# Patient Record
Sex: MALE | Race: WHITE | NOT HISPANIC OR LATINO | Employment: FULL TIME | URBAN - METROPOLITAN AREA
[De-identification: names, ages, dates, MRNs, and addresses within clinical notes are randomized per-mention and may not be internally consistent; named-entity substitution may affect disease eponyms.]

---

## 2023-06-26 LAB
ANION GAP IN SER/PLAS: 10 MMOL/L (ref 10–20)
CALCIUM (MG/DL) IN SER/PLAS: 9.5 MG/DL (ref 8.6–10.6)
CARBON DIOXIDE, TOTAL (MMOL/L) IN SER/PLAS: 30 MMOL/L (ref 21–32)
CHLORIDE (MMOL/L) IN SER/PLAS: 104 MMOL/L (ref 98–107)
CREATININE (MG/DL) IN SER/PLAS: 0.91 MG/DL (ref 0.5–1.3)
GFR MALE: >90 ML/MIN/1.73M2
GLUCOSE (MG/DL) IN SER/PLAS: 90 MG/DL (ref 74–99)
POTASSIUM (MMOL/L) IN SER/PLAS: 3.9 MMOL/L (ref 3.5–5.3)
SODIUM (MMOL/L) IN SER/PLAS: 140 MMOL/L (ref 136–145)
UREA NITROGEN (MG/DL) IN SER/PLAS: 9 MG/DL (ref 6–23)

## 2023-10-06 PROBLEM — N48.1 BALANITIS: Status: ACTIVE | Noted: 2023-10-06

## 2023-10-06 PROBLEM — L82.1 OTHER SEBORRHEIC KERATOSIS: Status: ACTIVE | Noted: 2021-06-14

## 2023-10-06 PROBLEM — I10 HTN (HYPERTENSION): Status: ACTIVE | Noted: 2023-10-06

## 2023-10-06 PROBLEM — D48.5 NEOPLASM OF UNCERTAIN BEHAVIOR OF SKIN: Status: ACTIVE | Noted: 2021-06-14

## 2023-10-06 PROBLEM — M54.6 PAIN IN THORACIC SPINE: Status: ACTIVE | Noted: 2023-10-06

## 2023-10-06 PROBLEM — N50.819 PERSISTENT TESTICULAR PAIN: Status: ACTIVE | Noted: 2023-10-06

## 2023-10-06 PROBLEM — R41.3 MEMORY CHANGES: Status: ACTIVE | Noted: 2023-10-06

## 2023-10-06 PROBLEM — K40.90 INGUINAL HERNIA: Status: ACTIVE | Noted: 2023-10-06

## 2023-10-06 PROBLEM — L57.9 SKIN CHANGES DUE TO CHRONIC EXPOSURE TO NONIONIZING RADIATION, UNSPECIFIED: Status: ACTIVE | Noted: 2021-06-14

## 2023-10-06 PROBLEM — R22.9 LUMP OF SKIN: Status: ACTIVE | Noted: 2023-10-06
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
## 2023-10-06 PROBLEM — F32.A DEPRESSION: Status: ACTIVE | Noted: 2023-10-06

## 2023-10-06 PROBLEM — K44.9 HIATAL HERNIA: Status: ACTIVE | Noted: 2023-10-06

## 2023-10-06 PROBLEM — N28.1 COMPLEX RENAL CYST: Status: ACTIVE | Noted: 2023-10-06

## 2023-10-06 PROBLEM — R94.31 LONG QT INTERVAL: Status: ACTIVE | Noted: 2023-10-06

## 2023-10-06 PROBLEM — M19.011 ARTHRITIS OF RIGHT ACROMIOCLAVICULAR JOINT: Status: ACTIVE | Noted: 2023-10-06

## 2023-10-06 PROBLEM — L82.0 INFLAMED SEBORRHEIC KERATOSIS: Status: ACTIVE | Noted: 2021-06-14

## 2023-10-06 PROBLEM — S22.000A: Status: ACTIVE | Noted: 2023-10-06

## 2023-10-06 PROBLEM — N20.0 CALCULUS OF KIDNEY: Status: ACTIVE | Noted: 2023-10-06

## 2023-10-06 PROBLEM — N20.0 NEPHROLITHIASIS, URIC ACID: Status: ACTIVE | Noted: 2023-10-06

## 2023-10-06 PROBLEM — D22.70 MELANOCYTIC NEVI OF UNSPECIFIED LOWER LIMB, INCLUDING HIP: Status: ACTIVE | Noted: 2021-06-14

## 2023-10-06 PROBLEM — R91.8 PULMONARY NODULES: Status: ACTIVE | Noted: 2023-10-06

## 2023-10-06 PROBLEM — T14.8XXA SKIN ABRASION: Status: ACTIVE | Noted: 2023-10-06

## 2023-10-06 PROBLEM — R10.13 DYSPEPSIA: Status: ACTIVE | Noted: 2023-10-06

## 2023-10-06 PROBLEM — D18.01 HEMANGIOMA OF SKIN AND SUBCUTANEOUS TISSUE: Status: ACTIVE | Noted: 2021-06-14

## 2023-10-06 PROBLEM — C44.41 BASAL CELL CARCINOMA OF SKIN OF SCALP AND NECK: Status: ACTIVE | Noted: 2021-06-14

## 2023-10-06 PROBLEM — K22.9 ESOPHAGEAL ABNORMALITY: Status: ACTIVE | Noted: 2023-10-06

## 2023-10-06 PROBLEM — M85.88 OSTEOPENIA OF LUMBAR SPINE: Status: ACTIVE | Noted: 2023-10-06

## 2023-10-06 PROBLEM — R01.1 HEART MURMUR: Status: ACTIVE | Noted: 2023-10-06

## 2023-10-06 PROBLEM — D22.39 MELANOCYTIC NEVI OF OTHER PARTS OF FACE: Status: ACTIVE | Noted: 2021-06-14

## 2023-10-06 PROBLEM — I51.7 LEFT VENTRICULAR HYPERTROPHY: Status: ACTIVE | Noted: 2023-10-06

## 2023-10-06 PROBLEM — N52.9 ERECTILE DYSFUNCTION: Status: ACTIVE | Noted: 2023-10-06

## 2023-10-06 PROBLEM — L21.9 SEBORRHEIC DERMATITIS, UNSPECIFIED: Status: ACTIVE | Noted: 2021-06-14

## 2023-10-06 PROBLEM — E55.9 VITAMIN D DEFICIENCY: Status: ACTIVE | Noted: 2023-10-06

## 2023-10-06 PROBLEM — Z85.828 PERSONAL HISTORY OF OTHER MALIGNANT NEOPLASM OF SKIN: Status: ACTIVE | Noted: 2021-06-14

## 2023-10-06 PROBLEM — D22.5 MELANOCYTIC NEVI OF TRUNK: Status: ACTIVE | Noted: 2021-06-14

## 2023-10-06 PROBLEM — M54.14 THORACIC RADICULITIS: Status: ACTIVE | Noted: 2023-10-06

## 2023-10-06 PROBLEM — D22.60 MELANOCYTIC NEVI OF UNSPECIFIED UPPER LIMB, INCLUDING SHOULDER: Status: ACTIVE | Noted: 2021-06-14

## 2023-10-06 PROBLEM — G47.00 INSOMNIA: Status: ACTIVE | Noted: 2023-10-06

## 2023-10-06 PROBLEM — M25.511 RIGHT SHOULDER PAIN: Status: ACTIVE | Noted: 2023-10-06

## 2023-10-06 PROBLEM — N28.89 KIDNEY MASS: Status: ACTIVE | Noted: 2023-10-06

## 2023-10-06 RX ORDER — UBIDECARENONE 75 MG
CAPSULE ORAL
COMMUNITY
Start: 2020-08-31

## 2023-10-06 RX ORDER — KETOCONAZOLE 20 MG/G
1 CREAM TOPICAL
COMMUNITY
Start: 2021-06-14 | End: 2023-10-17

## 2023-10-06 RX ORDER — TRAZODONE HYDROCHLORIDE 50 MG/1
0.5 TABLET ORAL NIGHTLY
COMMUNITY
Start: 2020-09-30 | End: 2023-12-15

## 2023-10-06 RX ORDER — LOSARTAN POTASSIUM AND HYDROCHLOROTHIAZIDE 12.5; 5 MG/1; MG/1
1 TABLET ORAL DAILY
COMMUNITY
Start: 2021-08-19 | End: 2023-11-20 | Stop reason: ALTCHOICE

## 2023-10-06 RX ORDER — TADALAFIL 10 MG/1
10 TABLET ORAL DAILY PRN
COMMUNITY
Start: 2022-04-04

## 2023-10-06 RX ORDER — NYSTATIN AND TRIAMCINOLONE ACETONIDE 100000; 1 [USP'U]/G; MG/G
CREAM TOPICAL 2 TIMES DAILY
COMMUNITY
Start: 2021-03-02 | End: 2023-10-17

## 2023-10-06 RX ORDER — ACETAMINOPHEN 500 MG
TABLET ORAL
COMMUNITY
Start: 2020-08-31

## 2023-10-11 ENCOUNTER — OFFICE VISIT (OUTPATIENT)
Dept: DERMATOLOGY | Facility: CLINIC | Age: 63
End: 2023-10-11
Payer: COMMERCIAL

## 2023-10-11 DIAGNOSIS — Z85.828 HISTORY OF NONMELANOMA SKIN CANCER: ICD-10-CM

## 2023-10-11 DIAGNOSIS — D22.5 MELANOCYTIC NEVUS OF TRUNK: ICD-10-CM

## 2023-10-11 DIAGNOSIS — L57.0 ACTINIC KERATOSIS: ICD-10-CM

## 2023-10-11 DIAGNOSIS — L82.1 SEBORRHEIC KERATOSIS: ICD-10-CM

## 2023-10-11 DIAGNOSIS — L21.9 SEBORRHEIC DERMATITIS: ICD-10-CM

## 2023-10-11 DIAGNOSIS — L57.8 DIFFUSE PHOTODAMAGE OF SKIN: ICD-10-CM

## 2023-10-11 DIAGNOSIS — L91.8 SKIN TAG: ICD-10-CM

## 2023-10-11 DIAGNOSIS — D48.5 NEOPLASM OF UNCERTAIN BEHAVIOR OF SKIN: Primary | ICD-10-CM

## 2023-10-11 PROCEDURE — 88305 TISSUE EXAM BY PATHOLOGIST: CPT | Performed by: DERMATOLOGY

## 2023-10-11 PROCEDURE — 99214 OFFICE O/P EST MOD 30 MIN: CPT | Performed by: DERMATOLOGY

## 2023-10-11 PROCEDURE — 17003 DESTRUCT PREMALG LES 2-14: CPT | Performed by: DERMATOLOGY

## 2023-10-11 PROCEDURE — 17110 DESTRUCTION B9 LES UP TO 14: CPT | Performed by: DERMATOLOGY

## 2023-10-11 PROCEDURE — 17000 DESTRUCT PREMALG LESION: CPT | Performed by: DERMATOLOGY

## 2023-10-11 PROCEDURE — 1036F TOBACCO NON-USER: CPT | Performed by: DERMATOLOGY

## 2023-10-11 PROCEDURE — 88305 TISSUE EXAM BY PATHOLOGIST: CPT

## 2023-10-11 PROCEDURE — 11301 SHAVE SKIN LESION 0.6-1.0 CM: CPT | Performed by: DERMATOLOGY

## 2023-10-11 RX ORDER — KETOCONAZOLE 20 MG/G
CREAM TOPICAL 2 TIMES DAILY
Qty: 60 G | Refills: 11 | Status: SHIPPED | OUTPATIENT
Start: 2023-10-11 | End: 2023-11-08

## 2023-10-11 NOTE — PROGRESS NOTES
Subjective     Kenn Oneal is a 63 y.o. male who presents for the following: Skin Exam.  He notes a brown bump on his right forearm, which appeared 6 months ago and has increased in size and gotten darker in color.  He also notes a bump underneath his left arm, which catches on his clothing and sometimes hurts.    Review of Systems:  No other skin or systemic complaints other than what is documented elsewhere in the note.    The following portions of the chart were reviewed this encounter and updated as appropriate:       Skin Cancer History  - history of BCC on right post auricle scalp diagnosed at initial visit on 11/25/20 s/p Mohs surgery by Dr. Curry on 2/1/21  - no h/o melanoma    Specialty Problems          Dermatology Problems    Basal cell carcinoma of skin of scalp and neck    Hemangioma of skin and subcutaneous tissue    Inflamed seborrheic keratosis    Melanocytic nevi of other parts of face    Melanocytic nevi of trunk    Melanocytic nevi of unspecified lower limb, including hip    Melanocytic nevi of unspecified upper limb, including shoulder    Neoplasm of uncertain behavior of skin    Other seborrheic keratosis    Personal history of other malignant neoplasm of skin    Seborrheic dermatitis, unspecified    Skin changes due to chronic exposure to nonionizing radiation, unspecified     Allergies:  Neosporin (neomycin-polymyx)    Current Medications / CAM's:    Current Outpatient Medications:     apixaban (Eliquis) 5 mg tablet, Take 1 tablet (5 mg) by mouth once daily., Disp: , Rfl:     cholecalciferol (Vitamin D-3) 5,000 Units tablet, Vitamin D3 125 MCG (5000 UT) Oral Tablet  Refills: 0     Tl CANDELARIO, Michael FUNK   Start : 31-Aug-2020  Active, Disp: , Rfl:     cyanocobalamin (Vitamin B-12) 500 mcg tablet, Take by mouth., Disp: , Rfl:     ketoconazole (NIZOral) 2 % cream, 1 Application., Disp: , Rfl:     ketoconazole (NIZOral) 2 % cream, Apply topically 2 times a day for 28 days. To affected areas of  face, Disp: 60 g, Rfl: 11    losartan-hydrochlorothiazide (Hyzaar) 50-12.5 mg tablet, Take 1 tablet by mouth once daily., Disp: , Rfl:     nystatin-triamcinolone (Mycolog II) cream, Apply topically 2 times a day., Disp: , Rfl:     tadalafil (Cialis) 10 mg tablet, Take 1 tablet (10 mg) by mouth once daily as needed., Disp: , Rfl:     traZODone (Desyrel) 50 mg tablet, Take 0.5 tablets (25 mg) by mouth once daily at bedtime., Disp: , Rfl:      Objective   Well appearing patient in no apparent distress; mood and affect are within normal limits.    A full examination was performed including scalp, face, eyes, ears, nose, lips, neck, chest, axillae, abdomen, back, bilateral upper extremities, and bilateral lower extremities. All findings within normal limits unless otherwise noted below.    Assessment/Plan   1. Neoplasm of uncertain behavior of skin  Right Distal Dorsal Forearm  7 mm dark brown pigmented, asymmetric macule with an asymmetric pigment network and irregular borders           Shave removal    Lesion length (cm):  0.7  Margin per side (cm):  0  Lesion diameter (cm):  0.7  Informed consent: discussed and consent obtained    Timeout: patient name, date of birth, surgical site, and procedure verified    Procedure prep:  Patient was prepped and draped  Anesthesia: the lesion was anesthetized in a standard fashion    Anesthetic:  1% lidocaine w/ epinephrine 1-100,000 local infiltration  Instrument used: flexible razor blade    Hemostasis achieved with: aluminum chloride    Outcome: patient tolerated procedure well    Post-procedure details: sterile dressing applied and wound care instructions given    Dressing type: bandage and petrolatum      Staff Communication: Dermatology Local Anesthesia: 1 % Lidocaine / Epinephrine - Amount:0.5ml    Specimen 1 - Dermatopathology- DERM LAB  Differential Diagnosis: SK v DN  Check Margins Yes/No?:    Comments:    Dermpath Lab: Routine Histopathology (formalin-fixed tissue)    2.  Skin tag  Left Axilla  On the patient's left inferior axilla, there is a 6 mm erythematous and flesh-colored, soft, fleshy, pedunculated papule with a surrounding rim of erythema    Inflamed Acrochordon - left inferior axilla.  The benign nature of this skin tag was discussed with the patient today and reassurance provided.  Given the history the patient provides of frequent irritation and associated symptoms as well as its inflamed appearance on exam today, I offered to treat this lesion with liquid nitrogen cryotherapy.  The patient expressed understanding, is in agreement with this plan, and wishes to proceed with cryotherapy today.    Destr of lesion - Left Axilla  Complexity: simple    Destruction method: cryotherapy    Informed consent: discussed and consent obtained    Lesion destroyed using liquid nitrogen: Yes    Cryotherapy cycles:  2  Outcome: patient tolerated procedure well with no complications    Post-procedure details: wound care instructions given      3. Actinic keratosis (3)  Nose (3)  Scattered on the patient's nose, there are 3 erythematous, gritty, scaly macules     Actinic Keratoses - scattered on nose.  The pre-cancerous nature of these lesions and treatment options were discussed with the patient today.  At this time, I recommend treatment with liquid nitrogen cryotherapy.  The patient expressed understanding, is in agreement with this plan, and wishes to proceed with cryotherapy today.    Destr of lesion - Nose  Complexity: simple    Destruction method: cryotherapy    Informed consent: discussed and consent obtained    Lesion destroyed using liquid nitrogen: Yes    Cryotherapy cycles:  1  Outcome: patient tolerated procedure well with no complications    Post-procedure details: wound care instructions given      4. Melanocytic nevus of trunk  Scattered on the patient's face, neck, trunk, and extremities, there are multiple small, round- to oval-shaped, brown-pigmented and pink-colored,  symmetric, uniform-appearing macules and dome-shaped papules    Clinically benign- to slightly atypical-appearing nevi - scattered on face, neck, trunk, and extremities.  The clinically benign- to slightly atypical-appearing nature of the patient's nevi was discussed with the patient today.  None of the patient's nevi meet threshold for biopsy today.  I emphasized the importance of performing monthly self-skin exams using the ABCDs of monitoring moles, which were reviewed with the patient today and an informational hand-out provided.  I also emphasized the importance of sun avoidance and sun protection with daily sunscreen use.  The patient expressed understanding and is in agreement with this plan.    5. Seborrheic keratosis  Scattered on the patient's face, neck, trunk, and extremities, there are multiple tan- to light brown-colored, hyperkeratotic, stuck-on appearing papules of varying size and shape    Seborrheic Keratoses - scattered on face, neck, trunk, and extremities.  The benign nature of these lesions was discussed with the patient today and reassurance provided.  No treatment is medically indicated for these lesions at this time.    6. Seborrheic dermatitis  Head - Anterior (Face)  On the patient's face, mainly the glabella and bilateral eyebrows and perinasal creases, there are pink, scaly patches with whitish-yellowish, greasy scale    Seborrheic Dermatitis - face.  The potentially chronic and intermittently flaring nature of this condition and treatment options were discussed extensively with the patient today.  At this time, I recommend topical anti-fungal therapy with Ketoconazole 2% cream, which the patient was instructed to apply twice daily to the affected areas of the face.  The risks, benefits, and side effects of this medication were discussed.  The patient expressed understanding and is in agreement with this plan.    ketoconazole (NIZOral) 2 % cream - Head - Anterior (Face)  Apply topically  2 times a day for 28 days. To affected areas of face    7. History of nonmelanoma skin cancer  Neck - Posterior  On the patient's right postauricular scalp, there is a well-healed scar with no evidence of recurrent growth on exam today.    History of basal cell carcinoma and actinic keratoses and photodamage.  There is no evidence of recurrence at the site of the patient's previously treated BCC on his right postauricular scalp on exam today.  The signs and symptoms of skin cancer were reviewed and the patient was advised to practice sun protection and sun avoidance, use daily sunscreen, and perform regular self skin exams.  I will have the patient return to our office in 1 year, pending the above biopsy result, for routine follow-up and skin exam, and the patient was instructed to call our office should the patient notice any new, changing, symptomatic, or otherwise concerning skin lesions before then.  The patient expressed understanding and is in agreement with this plan.    8. Diffuse photodamage of skin  Photodistributed  Diffuse photodamage with actinic changes with telangiectasia and mottled pigmentation in sun-exposed areas.    Photodamage.  The signs and symptoms of skin cancer were reviewed and the patient was advised to practice sun protection and sun avoidance, use daily sunscreen, and perform regular self skin exams.  Sun protection was discussed, including avoiding the mid-day sun, wearing a sunscreen with SPF at least 50, and stressing the need for reapplication of sunscreen and applying more than they think they need.

## 2023-10-13 LAB
LABORATORY COMMENT REPORT: NORMAL
PATH REPORT.FINAL DX SPEC: NORMAL
PATH REPORT.GROSS SPEC: NORMAL
PATH REPORT.MICROSCOPIC SPEC OTHER STN: NORMAL
PATH REPORT.RELEVANT HX SPEC: NORMAL
PATH REPORT.TOTAL CANCER: NORMAL

## 2023-10-17 ENCOUNTER — OFFICE VISIT (OUTPATIENT)
Dept: NEUROLOGY | Facility: CLINIC | Age: 63
End: 2023-10-17
Payer: COMMERCIAL

## 2023-10-17 VITALS
HEART RATE: 67 BPM | WEIGHT: 162 LBS | BODY MASS INDEX: 23.24 KG/M2 | SYSTOLIC BLOOD PRESSURE: 118 MMHG | RESPIRATION RATE: 16 BRPM | DIASTOLIC BLOOD PRESSURE: 75 MMHG

## 2023-10-17 DIAGNOSIS — R41.3 MEMORY DISORDER: Primary | ICD-10-CM

## 2023-10-17 PROCEDURE — 3074F SYST BP LT 130 MM HG: CPT | Performed by: PSYCHIATRY & NEUROLOGY

## 2023-10-17 PROCEDURE — 3078F DIAST BP <80 MM HG: CPT | Performed by: PSYCHIATRY & NEUROLOGY

## 2023-10-17 PROCEDURE — 99203 OFFICE O/P NEW LOW 30 MIN: CPT | Performed by: PSYCHIATRY & NEUROLOGY

## 2023-10-17 PROCEDURE — 1036F TOBACCO NON-USER: CPT | Performed by: PSYCHIATRY & NEUROLOGY

## 2023-10-17 RX ORDER — SERTRALINE HYDROCHLORIDE 50 MG/1
TABLET, FILM COATED ORAL
Qty: 30 TABLET | Refills: 3 | Status: CANCELLED | OUTPATIENT
Start: 2023-10-17

## 2023-10-17 RX ORDER — SERTRALINE HYDROCHLORIDE 50 MG/1
TABLET, FILM COATED ORAL
Qty: 90 TABLET | Refills: 2 | Status: SHIPPED | OUTPATIENT
Start: 2023-10-17 | End: 2023-11-30 | Stop reason: ALTCHOICE

## 2023-10-17 RX ORDER — AMLODIPINE BESYLATE 2.5 MG/1
2.5 TABLET ORAL DAILY
COMMUNITY
Start: 2023-09-21 | End: 2024-04-15 | Stop reason: SDUPTHER

## 2023-10-17 ASSESSMENT — ENCOUNTER SYMPTOMS
DEPRESSION: 0
OCCASIONAL FEELINGS OF UNSTEADINESS: 0
LOSS OF SENSATION IN FEET: 0

## 2023-10-17 ASSESSMENT — PATIENT HEALTH QUESTIONNAIRE - PHQ9
SUM OF ALL RESPONSES TO PHQ9 QUESTIONS 1 AND 2: 0
2. FEELING DOWN, DEPRESSED OR HOPELESS: NOT AT ALL
1. LITTLE INTEREST OR PLEASURE IN DOING THINGS: NOT AT ALL

## 2023-10-17 ASSESSMENT — PAIN SCALES - GENERAL: PAINLEVEL: 0-NO PAIN

## 2023-10-17 NOTE — PROGRESS NOTES
Subjective     Prasad Oneal is a right handed  63 y.o. year old male who presents with memory loss.   Visit type: Referral by PCP    HPI  He developed slowly progressive short term memory loss 1.5 year ago.He states that English is not his first language and has difficulty in finding English words but not [place] words which is his native language.He also forgets the names of people and forgets the places of the folder in computer.He is in slower in his job and memory loss impaired his function at work.  He also states that he is clumsy and bumps into objects and drop things but no myoclonic jerk.  Mood:feels depression  after his mother passed away 2 years ago and  his wife last year  Difficulty in falling sleep and staying sleep.He  goes  to bed at 10 pm and he wakes up at 4 and 5 AM and can`t go back to sleep.He snores and is schedules for sleep study.He has fatigue during day.   PMH:HTN,Arrythmia  Medication:Losartan eliquis  FH:Father had PD   Social:Soft ware developper   Occasionally drinks alcohol  He is Vegetarian but eats egg,he is not vegan          Review of Systems  All other system have been reviewed and are negative for complaint.  Objective   Neurological Exam  Mental Status  Awake, alert and oriented to person, place and time.    Cranial Nerves  CN III, IV, VI: Extraocular movements intact bilaterally.  CN V: Facial sensation is normal.  CN VII: Full and symmetric facial movement.    Motor  Normal muscle bulk throughout. Normal muscle tone. No abnormal involuntary movements. Strength is 5/5 throughout all four extremities.    Sensory  Sensation is intact to light touch, pinprick, vibration and proprioception in all four extremities.    Reflexes                                            Right                      Left  Biceps                                 1+                         1+  Patellar                                1+                         1+  Right Plantar: downgoing  Left  Plantar: downgoing    Coordination  Right: Finger-to-nose normal.    Gait  Normal casual, toe, heel and tandem gait.   mild bradykinesia in lt side    Physical Exam  Eyes:      Extraocular Movements: Extraocular movements intact.   Neurological:      Motor: Motor strength is normal.     Deep Tendon Reflexes:      Reflex Scores:       Bicep reflexes are 1+ on the right side and 1+ on the left side.       Patellar reflexes are 1+ on the right side and 1+ on the left side.     Office Visit from 10/17/2023 in [facility] with Jose Mirza MD     10/18/2023     1937 Last Filed Value   MoCA      Visuospatial/Executive 5 5   Naming 3 3   Memory (Score '0' as this is an Unscored Section) -- --   Attention: Read List of Digits 2 2   Attention: Read List of Letters 1 1   Attention: Serial Sevens 3 3   Language: Repeat 2 2   Language: Fluency 0 0   Abstraction 2 2   Delayed Recall 1 1   Orientation 6 6   Add 1 Point if </=12 yr Education 0 0   MOCA Total Score -- --   MoCA Certification                                     Assessment/Plan Prasad Oneal is a right handed  63 y.o. year old male who presents with memory loss.MOCA today was scored 25/30.neurology exam is reassuring.   Plan:  TSH,B12,Neuropsych test,brain MRI without GD

## 2023-10-17 NOTE — LETTER
October 18, 2023     Michael Boothe MD  [address] [facility], [address]  [facility] [address]    Patient: Kenn Oneal   YOB: 1960   Date of Visit: 10/17/2023       Dear Dr. Michael Boothe MD:    Thank you for referring Kenn Oneal to me for evaluation. Below are my notes for this consultation.  If you have questions, please do not hesitate to call me. I look forward to following your patient along with you.       Sincerely,     Jose Mirza MD      CC: No Recipients  ______________________________________________________________________________________    Subjective     Prasad Oneal is a right handed  63 y.o. year old male who presents with memory loss.   Visit type: Referral by PCP    HPI  He developed slowly progressive short term memory loss 1.5 year ago.He states that English is not his first language and has difficulty in finding English words but not [place] words which is his native language.He also forgets the names of people and forgets the places of the folder in computer.He is in slower in his job and memory loss impaired his function at work.  He also states that he is clumsy and bumps into objects and drop things but no myoclonic jerk.  Mood:feels depression  after his mother passed away 2 years ago and  his wife last year  Difficulty in falling sleep and staying sleep.He  goes  to bed at 10 pm and he wakes up at 4 and 5 AM and can`t go back to sleep.He snores and is schedules for sleep study.He has fatigue during day.   PMH:HTN,Arrythmia  Medication:Losartan eliquis  FH:Father had PD   Social:Soft ware developper   Occasionally drinks alcohol  He is Vegetarian but eats egg,he is not vegan          Review of Systems  All other system have been reviewed and are negative for complaint.  Objective   Neurological Exam  Mental Status  Awake, alert and oriented to person, place and time.    Cranial Nerves  CN III, IV, VI:  Extraocular movements intact bilaterally.  CN V: Facial sensation is normal.  CN VII: Full and symmetric facial movement.    Motor  Normal muscle bulk throughout. Normal muscle tone. No abnormal involuntary movements. Strength is 5/5 throughout all four extremities.    Sensory  Sensation is intact to light touch, pinprick, vibration and proprioception in all four extremities.    Reflexes                                            Right                      Left  Biceps                                 1+                         1+  Patellar                                1+                         1+  Right Plantar: downgoing  Left Plantar: downgoing    Coordination  Right: Finger-to-nose normal.    Gait  Normal casual, toe, heel and tandem gait.   mild bradykinesia in lt side    Physical Exam  Eyes:      Extraocular Movements: Extraocular movements intact.   Neurological:      Motor: Motor strength is normal.     Deep Tendon Reflexes:      Reflex Scores:       Bicep reflexes are 1+ on the right side and 1+ on the left side.       Patellar reflexes are 1+ on the right side and 1+ on the left side.     Office Visit from 10/17/2023 in [facility] with Jose Mirza MD     10/18/2023     1937 Last Filed Value   MoCA      Visuospatial/Executive 5 5   Naming 3 3   Memory (Score '0' as this is an Unscored Section) -- --   Attention: Read List of Digits 2 2   Attention: Read List of Letters 1 1   Attention: Serial Sevens 3 3   Language: Repeat 2 2   Language: Fluency 0 0   Abstraction 2 2   Delayed Recall 1 1   Orientation 6 6   Add 1 Point if </=12 yr Education 0 0   MOCA Total Score -- --   MoCA Certification                                     Assessment/Plan Prasad Oneal is a right handed  63 y.o. year old male who presents with memory loss.MOCA today was scored 25/30.neurology exam is reassuring.   Plan:  TSH,B12,Neuropsych test,brain MRI without GD                  Attestation signed by Jose MICHAUD  MD Hermes at 10/18/2023 10:34 PM:  I saw and evaluated the patient. I personally obtained the key and critical portions of the history and physical exam or was physically present for key and critical portions performed by the resident/fellow. I reviewed the resident/fellow's documentation and discussed the patient with the resident/fellow. I agree with the resident/fellow's medical decision making as documented in the note.  Memory loss.  Possible MCI (versus mild dementia).  Agree with cognitive workup.  To then have summary conference to review results with the patient and his family, and determine need for treatment.  Included neuropsych testing since he has depression as a confounder, to differentiate dementia from pseudodementia.   To then have summary conference to review results with the patient and his family, and determine need for treatment.

## 2023-10-17 NOTE — PATIENT INSTRUCTIONS
You were visited by Dr Mirza and Dr Carlisle today,  Your neurologic exam is pretty normal and reassuring.On Memory test you were scored 25/30 and is borderline.Normal score is 26.Your poor sleep and your depression and anxiety could cause short term memory loss as well.Therefore we plan put you on antidepressant called Sertraline which helps with mood and its effect starts after 3 weeks.    Plan:  -Start taking Sertraline half tablet in the morning for one week then after one week take one tablet per day in the morning  -Schedule for brain MRI  -Do blood test you do not need fasting for the test.  -Schedule for neuropsychology test

## 2023-10-18 ASSESSMENT — MONTREAL COGNITIVE ASSESSMENT (MOCA)
9. REPEAT EACH SENTENCE: 2
10. [FLUENCY] NAME WORDS STARTING WITH DESIGNATED LETTER: 0
8. SERIAL SUBTRACTION OF 7S: 3
4. NAME EACH OF THE THREE ANIMALS SHOWN: 3
12. MEMORY INDEX SCORE: 1
11. FOR EACH PAIR OF WORDS, WHAT CATEGORY DO THEY BELONG TO (OUT OF 2): 2
7. [VIGILENCE] TAP WHEN HEARING DESIGNATED LETTER: 1
VISUOSPATIAL/EXECUTIVE SUBSCORE: 5
WHAT LEVEL OF EDUCATION WAS ATTAINED: 0
13. ORIENTATION SUBSCORE: 6
6. READ LIST OF DIGITS [FORWARD/BACKWARD]: 2

## 2023-10-19 ENCOUNTER — TELEPHONE (OUTPATIENT)
Dept: PRIMARY CARE | Facility: CLINIC | Age: 63
End: 2023-10-19
Payer: COMMERCIAL

## 2023-10-19 NOTE — TELEPHONE ENCOUNTER
Result Communication    No results found from the In Basket message.    7:13 AM      Results  successfully communicated with the  and they  their understanding.  This note was entered in error

## 2023-10-24 ENCOUNTER — LAB (OUTPATIENT)
Dept: LAB | Facility: LAB | Age: 63
End: 2023-10-24
Payer: COMMERCIAL

## 2023-10-24 DIAGNOSIS — R41.3 MEMORY DISORDER: ICD-10-CM

## 2023-10-24 LAB
TSH SERPL-ACNC: 1 MIU/L (ref 0.44–3.98)
VIT B12 SERPL-MCNC: 618 PG/ML (ref 211–911)

## 2023-10-24 PROCEDURE — 84443 ASSAY THYROID STIM HORMONE: CPT

## 2023-10-24 PROCEDURE — 82607 VITAMIN B-12: CPT

## 2023-10-24 PROCEDURE — 36415 COLL VENOUS BLD VENIPUNCTURE: CPT
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
## 2023-11-02 ENCOUNTER — OFFICE VISIT (OUTPATIENT)
Dept: PSYCHOLOGY | Facility: CLINIC | Age: 63
End: 2023-11-02
Payer: COMMERCIAL

## 2023-11-02 DIAGNOSIS — F33.0 MAJOR DEPRESSIVE DISORDER, RECURRENT, MILD (CMS-HCC): ICD-10-CM

## 2023-11-02 DIAGNOSIS — R41.3 COMPLAINTS OF MEMORY DISTURBANCE: ICD-10-CM

## 2023-11-02 DIAGNOSIS — G31.84 MILD NEUROCOGNITIVE DISORDER: Primary | ICD-10-CM

## 2023-11-02 PROCEDURE — 3074F SYST BP LT 130 MM HG: CPT

## 2023-11-02 PROCEDURE — 1036F TOBACCO NON-USER: CPT

## 2023-11-02 PROCEDURE — 99211NT NEUROPYSCH TESTING PENDING FINAL BILLING

## 2023-11-02 PROCEDURE — 3078F DIAST BP <80 MM HG: CPT

## 2023-11-09 ENCOUNTER — TELEMEDICINE CLINICAL SUPPORT (OUTPATIENT)
Dept: PSYCHOLOGY | Facility: CLINIC | Age: 63
End: 2023-11-09
Payer: COMMERCIAL

## 2023-11-09 DIAGNOSIS — R41.3 COMPLAINTS OF MEMORY DISTURBANCE: ICD-10-CM

## 2023-11-09 DIAGNOSIS — F33.0 MAJOR DEPRESSIVE DISORDER, RECURRENT, MILD (CMS-HCC): ICD-10-CM

## 2023-11-09 DIAGNOSIS — R41.3 MEMORY LOSS: Primary | ICD-10-CM

## 2023-11-09 DIAGNOSIS — G31.84 MILD NEUROCOGNITIVE DISORDER: ICD-10-CM

## 2023-11-09 PROCEDURE — 96138 PSYCL/NRPSYC TECH 1ST: CPT | Mod: 95

## 2023-11-09 PROCEDURE — 96138 PSYCL/NRPSYC TECH 1ST: CPT

## 2023-11-09 PROCEDURE — 96139 PSYCL/NRPSYC TST TECH EA: CPT | Mod: 95

## 2023-11-09 PROCEDURE — 96116 NUBHVL XM PHYS/QHP 1ST HR: CPT | Mod: AH,95

## 2023-11-09 PROCEDURE — 96132 NRPSYC TST EVAL PHYS/QHP 1ST: CPT | Mod: AH,95

## 2023-11-09 PROCEDURE — 96132 NRPSYC TST EVAL PHYS/QHP 1ST: CPT

## 2023-11-09 PROCEDURE — 96139 PSYCL/NRPSYC TST TECH EA: CPT

## 2023-11-09 PROCEDURE — 96116 NUBHVL XM PHYS/QHP 1ST HR: CPT

## 2023-11-09 PROCEDURE — 96133 NRPSYC TST EVAL PHYS/QHP EA: CPT

## 2023-11-09 PROCEDURE — 96133 NRPSYC TST EVAL PHYS/QHP EA: CPT | Mod: AH,95

## 2023-11-09 NOTE — PROGRESS NOTES
NEUROPSYCHOLOGICAL FEEDBACK NOTE  Name: Prasad Oneal  : 1960  MRN: [id number]  Referring Provider: Jose Mirza MD    Date of Service: 23    Reason for Referral: Prasad Oneal was referred for neuropsychological evaluation given a history of reported memory loss. The patient returned today via virtual appointment for feedback regarding cognitive testing that took place on 2023.    This appointment was completed virtually, and the risks and benefits of receiving telehealth services were reviewed. A safety plan was created confirming the location of the patient and alternative contact number for the patient if the telehealth encounter is interrupted. In-office care may be recommended if needed. Telehealth sessions are not being recorded and personal health information is protected. All questions were answered, and the patient provided verbal consent to proceed with telehealth services.     Diagnosis:   Memory loss   Mild Neurocognitive Disorder   Major Depressive Disorder    Session Detail:   Current findings were discussed in detail including the diagnosis of Mild Neurocognitive Disorder due to deficits with initial learning of visual information, as well as variable performance on speed of processing and executive functioning tasks. He was informed the exact etiology is unknown, but further diagnostic testing and follow-up was recommended. The patient showed adequate understanding of all findings and recommendations. All questions were answered and the patient reported having access to the full report.     15807 = 0    2023: 45322 = 1; 60373 = 1; 29686 = 2; 98480 = 1; 53931 = 7

## 2023-11-13 NOTE — PROGRESS NOTES
NEUROPSYCHOLOGICAL DATA SUMMARY    Name: Prasad Oneal   : 1960   MRN: [id number]     Date of Service: 2023     Age: 63 y.o.   Race: White  Education: 18  Preferred Hand: RH  Examiner: Araceli Rodriguez PsyD  Technician: Kimberley Dow MA    Descriptors:     T-Score Standard Score Z-Score Scaled Score %ile Rank   Exceptionally High > 70 > 130 > 2.0 > 16 > 98   Above Average 64-69 120-129 1.4-1.9 15 91-97   High Average 57-63 110-119 0.7-1.3 12-14 75-90   Average 43-56  0.6 to -0.6 8-11 25-74   Low Average 37-42 80-89 -1.3 to -0.7 6-7 9-24   Below Average 30-36 70-79 -2.0 to -1.4 4-5 2-8   Exceptionally Low < 30 < 70 < -2.0 < 4 < 2                Premorbid  Raw Std Sc %ile    WTAR Reading  50 126 96    WTAR EST FSIQ 50 125 95            WAIS IV  Raw Std Sc %ile    Similarities  27 105 63    DS Total   30 110 75    DS Forward  12 110 75    DS Backward 9 105 63    DS Sequencing  9 105 63    Matrix  21 120 91    Coding  55 95 37            RBANS: Form = 1 Raw Std Sc %ile    Line Orientation  15 91.75 29            Stroop  Raw Std Sc %ile    Word   78 67 1    Color  61 77.5 7    Color-Word  40 95.5 38    Interference  7 110.5 76            TMT: Reg  Raw Std Sc %ile    Part A: Errors = 0 71 55 0    Part B: Errors= 0 138 70 2            Grooved Pegs Raw Std Sc %ile    Dom: Drops= 0 78 89.5 24    Non Dom: Drops= 0 81 95.5 38            Naming   Raw Std Sc %ile    NAB Naming  30 106 66            COWAT  Raw Std Sc %ile    Phonemic  34 88 21    Semantic   20 94 34            WMS-IV         LM I   16 80 9    LM II   14 85 16    LM Rec  A= 10 B= 10 3-9    LM Contrast - 105 63            ECFT  Raw Std Sc %ile    Copy   33 100 50    Immediate Recall  6 80 9    Delayed Recall  5 75 5    Delayed Recognition  10 90 25    Matching   10 - >15th            HVLT-R: Form= 1 Raw Std Sc %ile    Trial 1   7 95.5 38    Trial 2   11 110.5 76    Trial 3   12 115 84    Total Recall (sum T1-T3)  30 107.5 69    Trial 4 (Delayed  Recall)  12 116.5 86    % Retained   100 107.5 69    Total Hits   12 - -    Total False Positives  1 - -    Discrimination Index  11 103 58            BVMT-R: Form= 1  Raw Std Sc %ile    Trial 1   3 82 12    Trial 2   4 71.5 3    Trial 3   3 55 0    Total Recall  10 65.5 1    Learning   1 77.5 7    Delayed Recall  4 70 2    % Retained   100 - >16    Total Hits   5 - 11-16    Total False Positives  0 - >16    Discrimination Index  5 - 11-16    Copy  12 -             Clock   WNL              M-WCST  Raw Std Sc %ile    # Cat  6 107.5 69    # Persev Errors 1 101.5 54    # Total Errors 3 112 79    % Persev Errors 0.33 91 27            Mood Measures  Raw T Score      GDS  17 -     SHERRI-7  5 -                              Test Normative References:  Test Manual  NOEMI Garrett, NOEMI Garrett, & Psychological Assessment Resources[employer] Inc[employer] (2004). Revised comprehensive norms for an expanded [place] battery: Demographically adjusted neuropsychological norms for  and  adults, professional manual. [facility]: Psychological Assessment Resources  MARLENA Sullivan ( 2002 ). North American Adult Reading Test: Age norms, reliability, and validity. Journal of Clinical and Experimental Neuropsychology, 24, 1123 - 1137  Yesosiel, JASON MICHAUD., Gricel, TNano QUILES., ASHTYN Cabral., FATOU Arreola, LARA Ricardo, & Alecia, JOVITA O. (1983). Development and validation of a geriatric depression screening scale: a preliminary report. Journal of Psychiatric Research, 17(1), 37-49.  GILES Dozier (1978). Stroop Color and Word Test: A manual for clinical and experimental uses. [place], [place]: [employer].

## 2023-11-19 ENCOUNTER — HOSPITAL ENCOUNTER (OUTPATIENT)
Dept: RADIOLOGY | Facility: HOSPITAL | Age: 63
Discharge: HOME | End: 2023-11-19
Payer: COMMERCIAL

## 2023-11-19 DIAGNOSIS — R41.3 MEMORY DISORDER: ICD-10-CM

## 2023-11-19 PROCEDURE — 70551 MRI BRAIN STEM W/O DYE: CPT

## 2023-11-19 PROCEDURE — 70551 MRI BRAIN STEM W/O DYE: CPT | Performed by: RADIOLOGY

## 2023-11-20 ENCOUNTER — OFFICE VISIT (OUTPATIENT)
Dept: CARDIOLOGY | Facility: HOSPITAL | Age: 63
End: 2023-11-20
Payer: COMMERCIAL

## 2023-11-20 VITALS
SYSTOLIC BLOOD PRESSURE: 123 MMHG | OXYGEN SATURATION: 99 % | BODY MASS INDEX: 22.6 KG/M2 | DIASTOLIC BLOOD PRESSURE: 76 MMHG | HEIGHT: 70 IN | WEIGHT: 157.9 LBS

## 2023-11-20 DIAGNOSIS — Z79.01 CHRONIC ANTICOAGULATION: ICD-10-CM

## 2023-11-20 DIAGNOSIS — I48.0 PAF (PAROXYSMAL ATRIAL FIBRILLATION) (MULTI): ICD-10-CM

## 2023-11-20 DIAGNOSIS — I10 HYPERTENSION, UNSPECIFIED TYPE: ICD-10-CM

## 2023-11-20 DIAGNOSIS — I48.91 ATRIAL FIBRILLATION, UNSPECIFIED TYPE (MULTI): Primary | ICD-10-CM

## 2023-11-20 DIAGNOSIS — R01.1 HEART MURMUR: ICD-10-CM

## 2023-11-20 LAB
ATRIAL RATE: 65 BPM
P AXIS: 67 DEGREES
P OFFSET: 171 MS
P ONSET: 114 MS
PR INTERVAL: 202 MS
Q ONSET: 215 MS
QRS COUNT: 10 BEATS
QRS DURATION: 110 MS
QT INTERVAL: 428 MS
QTC CALCULATION(BAZETT): 445 MS
QTC FREDERICIA: 439 MS
R AXIS: -34 DEGREES
T AXIS: 67 DEGREES
T OFFSET: 429 MS
VENTRICULAR RATE: 65 BPM

## 2023-11-20 PROCEDURE — 99214 OFFICE O/P EST MOD 30 MIN: CPT | Performed by: INTERNAL MEDICINE

## 2023-11-20 PROCEDURE — 1036F TOBACCO NON-USER: CPT | Performed by: INTERNAL MEDICINE

## 2023-11-20 PROCEDURE — 3074F SYST BP LT 130 MM HG: CPT | Performed by: INTERNAL MEDICINE

## 2023-11-20 PROCEDURE — 93010 ELECTROCARDIOGRAM REPORT: CPT | Performed by: INTERNAL MEDICINE

## 2023-11-20 PROCEDURE — 93005 ELECTROCARDIOGRAM TRACING: CPT | Performed by: INTERNAL MEDICINE

## 2023-11-20 PROCEDURE — 3078F DIAST BP <80 MM HG: CPT | Performed by: INTERNAL MEDICINE

## 2023-11-20 RX ORDER — HYDROCHLOROTHIAZIDE 25 MG/1
12.5 TABLET ORAL DAILY
Qty: 45 TABLET | Refills: 3 | Status: SHIPPED | OUTPATIENT
Start: 2023-11-20 | End: 2023-11-20 | Stop reason: SDUPTHER

## 2023-11-20 RX ORDER — LOSARTAN POTASSIUM 100 MG/1
100 TABLET ORAL DAILY
Qty: 90 TABLET | Refills: 3 | Status: SHIPPED | OUTPATIENT
Start: 2023-11-20 | End: 2024-04-15 | Stop reason: SDUPTHER

## 2023-11-20 RX ORDER — HYDROCHLOROTHIAZIDE 25 MG/1
25 TABLET ORAL DAILY
Qty: 90 TABLET | Refills: 3 | Status: SHIPPED | OUTPATIENT
Start: 2023-11-20 | End: 2024-04-15 | Stop reason: SDUPTHER

## 2023-11-20 ASSESSMENT — ENCOUNTER SYMPTOMS
DEPRESSION: 0
OCCASIONAL FEELINGS OF UNSTEADINESS: 0
LOSS OF SENSATION IN FEET: 0

## 2023-11-20 ASSESSMENT — PATIENT HEALTH QUESTIONNAIRE - PHQ9
2. FEELING DOWN, DEPRESSED OR HOPELESS: NOT AT ALL
1. LITTLE INTEREST OR PLEASURE IN DOING THINGS: NOT AT ALL
SUM OF ALL RESPONSES TO PHQ9 QUESTIONS 1 & 2: 0
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
## 2023-11-20 NOTE — PATIENT INSTRUCTIONS
1. Hypertension- please check your blood pressure and heart rate daily and log results for review. To continue  losartan 100 mg daily and HCTZ 25 mg daily BP appears adequatley controlled.   2. Aortic insufficiency ( leaking aortic valve) - moderate by echo, mild to moderate by MRI. Follow up echo in September difficult to grade AI though LV size remains normal. Totally asymptomatic at this point. Follow up echo in 1 year unless symptoms.   3. Atypical chest pain-no inducible ischemia by stress cardiac MRI. totally resolved.  4. Palpitations- pt found to have PAF with [id number] score of 1. Chronic anticoagulation- continue eliquis 5 mg bid. 2 week monitor without any AFIb, and no palpitations. To continue current regimen for now. If further palpitations can consider ablation in the future.   5. Pt with snoring and insomnia with AFIb-  sleep study scheduled to be done soon to assess for possible AGUSTINA. Untreated sleep apnea may also make it difficult to control your blood pressure.  6. Due for lipid panel ( last done 11/2022)   7. Continue current level of exercise.   Return to clinic in 6 months.

## 2023-11-23 ENCOUNTER — APPOINTMENT (OUTPATIENT)
Dept: CARDIOLOGY | Facility: HOSPITAL | Age: 63
End: 2023-11-23
Payer: COMMERCIAL

## 2023-11-30 ENCOUNTER — OFFICE VISIT (OUTPATIENT)
Dept: SLEEP MEDICINE | Facility: CLINIC | Age: 63
End: 2023-11-30
Payer: COMMERCIAL

## 2023-11-30 VITALS
HEART RATE: 58 BPM | OXYGEN SATURATION: 98 % | DIASTOLIC BLOOD PRESSURE: 64 MMHG | HEIGHT: 70 IN | BODY MASS INDEX: 22.62 KG/M2 | WEIGHT: 158 LBS | SYSTOLIC BLOOD PRESSURE: 113 MMHG

## 2023-11-30 DIAGNOSIS — R06.83 SNORING: Primary | ICD-10-CM

## 2023-11-30 PROCEDURE — 3074F SYST BP LT 130 MM HG: CPT | Performed by: NURSE PRACTITIONER

## 2023-11-30 PROCEDURE — 3078F DIAST BP <80 MM HG: CPT | Performed by: NURSE PRACTITIONER

## 2023-11-30 PROCEDURE — 99214 OFFICE O/P EST MOD 30 MIN: CPT | Performed by: NURSE PRACTITIONER

## 2023-11-30 PROCEDURE — 99204 OFFICE O/P NEW MOD 45 MIN: CPT | Performed by: NURSE PRACTITIONER

## 2023-11-30 PROCEDURE — 1036F TOBACCO NON-USER: CPT | Performed by: NURSE PRACTITIONER

## 2023-11-30 ASSESSMENT — ENCOUNTER SYMPTOMS
DEPRESSION: 0
LOSS OF SENSATION IN FEET: 0
OCCASIONAL FEELINGS OF UNSTEADINESS: 0

## 2023-11-30 ASSESSMENT — SLEEP AND FATIGUE QUESTIONNAIRES
HOW LIKELY ARE YOU TO NOD OFF OR FALL ASLEEP WHILE LYING DOWN TO REST IN THE AFTERNOON WHEN CIRCUMSTANCES PERMIT: MODERATE CHANCE OF DOZING
WAKING_TOO_EARLY: MODERATE
SATISFACTION_WITH_CURRENT_SLEEP_PATTERN: DISSATISFIED
HOW LIKELY ARE YOU TO NOD OFF OR FALL ASLEEP WHEN YOU ARE A PASSENGER IN A CAR FOR AN HOUR WITHOUT A BREAK: SLIGHT CHANCE OF DOZING
HOW LIKELY ARE YOU TO NOD OFF OR FALL ASLEEP WHILE SITTING AND READING: SLIGHT CHANCE OF DOZING
HOW LIKELY ARE YOU TO NOD OFF OR FALL ASLEEP WHILE LYING DOWN TO REST IN THE AFTERNOON WHEN CIRCUMSTANCES PERMIT: MODERATE CHANCE OF DOZING
HOW LIKELY ARE YOU TO NOD OFF OR FALL ASLEEP WHILE SITTING AND TALKING TO SOMEONE: NO CHANCE OF DOZING
SITING INACTIVE IN A PUBLIC PLACE LIKE A CLASS ROOM OR A MOVIE THEATER: NO CHANCE OF DOZING
SLEEP_PROBLEM_NOTICEABLE_TO_OTHERS: SOMEWHAT
HOW LIKELY ARE YOU TO NOD OFF OR FALL ASLEEP WHILE WATCHING TV: MODERATE CHANCE OF DOZING
SITING INACTIVE IN A PUBLIC PLACE LIKE A CLASS ROOM OR A MOVIE THEATER: WOULD NEVER DOZE
HOW LIKELY ARE YOU TO NOD OFF OR FALL ASLEEP WHILE SITTING AND READING: SLIGHT CHANCE OF DOZING
HOW LIKELY ARE YOU TO NOD OFF OR FALL ASLEEP WHILE SITTING AND TALKING TO SOMEONE: WOULD NEVER DOZE
HOW LIKELY ARE YOU TO NOD OFF OR FALL ASLEEP IN A CAR, WHILE STOPPED FOR A FEW MINUTES IN TRAFFIC: NO CHANCE OF DOZING
DIFFICULTY_FALLING_ASLEEP: MILD
HOW LIKELY ARE YOU TO NOD OFF OR FALL ASLEEP WHILE WATCHING TV: MODERATE CHANCE OF DOZING
DIFFICULTY_STAYING_ASLEEP: SEVERE
HOW LIKELY ARE YOU TO NOD OFF OR FALL ASLEEP IN A CAR, WHILE STOPPED FOR A FEW MINUTES IN TRAFFIC: SLIGHT CHANCE OF DOZING
ESS-CHAD TOTAL SCORE: 7
HOW LIKELY ARE YOU TO NOD OFF OR FALL ASLEEP WHILE SITTING QUIETLY AFTER LUNCH WITHOUT ALCOHOL: NO CHANCE OF DOZING
SLEEP_PROBLEM_INTERFERES_DAILY_ACTIVITIES: MUCH
HOW LIKELY ARE YOU TO NOD OFF OR FALL ASLEEP WHILE SITTING QUIETLY AFTER LUNCH WITHOUT ALCOHOL: WOULD NEVER DOZE
ESS TOTAL SCORE: 6
WORRIED_DISTRESSED_DUE_TO_SLEEP: MUCH
HOW LIKELY ARE YOU TO NOD OFF OR FALL ASLEEP WHEN YOU ARE A PASSENGER IN A CAR FOR AN HOUR WITHOUT A BREAK: SLIGHT CHANCE OF DOZING

## 2023-11-30 ASSESSMENT — COLUMBIA-SUICIDE SEVERITY RATING SCALE - C-SSRS
6. HAVE YOU EVER DONE ANYTHING, STARTED TO DO ANYTHING, OR PREPARED TO DO ANYTHING TO END YOUR LIFE?: NO
2. HAVE YOU ACTUALLY HAD ANY THOUGHTS OF KILLING YOURSELF?: NO
1. IN THE PAST MONTH, HAVE YOU WISHED YOU WERE DEAD OR WISHED YOU COULD GO TO SLEEP AND NOT WAKE UP?: NO

## 2023-11-30 ASSESSMENT — PAIN SCALES - GENERAL: PAINLEVEL: 0-NO PAIN

## 2023-11-30 NOTE — ASSESSMENT & PLAN NOTE
HSAT ordered for further work up  Discussed CPAP and dental appliance, notes his dentist makes Gomez. Will consider pending results of testing and apnea v hypopnea burden  He is agreeable and aware of my pending leave, will follow up with one of my colleagues PRN

## 2023-11-30 NOTE — PROGRESS NOTES
"    Patient: Prasad Oneal    [id number]  : 1960 -- AGE 63 y.o.    Provider: GORAN Canela     Location [facility]   Service Date: 2023              [facility]  New Visit Note        HISTORY OF PRESENT ILLNESS     The patient's referring provider is: Dr. Armida Ying    HISTORY OF PRESENT ILLNESS   Prasad Oneal \"Kenn\" is a 63 y.o. male who presents to a [facility] for a sleep medicine evaluation with concerns of suspected sleep apnea. He is a .     He has a history of heart murmur, HTN, LVH, long QT interval, hernia, ED, basal cell carcinoma, depression, arthritis, pulmonary nodules, insomnia.     Past Sleep History  Patient has the following sleep-related diagnoses: none  Current History    On today's visit, the patient reports ongoing snoring, gasping choking. Was encouraged by his PCP and cardiology to have sleep study completed. He does endorse sleepiness at times during the day, memory and concentration troubles, irritability. He sometimes wakes in the night, sometimes hard to get back to sleep. Dry mouth, grinds his teeth    Sleep schedule  on weekdays / work days:  Usual Bedtime:  11pm     Falls asleep around:  30 min   # of awakenings: 1x per night  Wake time:  5-6 AM    Naps: afternoon for 30 min or so    Preferred sleeping position: side    Sleep-related ROS:    Problems going to sleep: No problems going to sleep    Sleep Maintenance: wakes up about 1 times per night  Problems staying asleep Problems Staying Asleep: breathing problems and no clear reason    Breathing during sleep: snoring, snorting during sleep, witnessed apneas, and gasping/choking for air    RLS screen:  RLSSCREEN: - Sensations: Patient does not have unusual sensations in their extremities that cause an urge to move them     Sleep-related behaviors:   dreams upon falling asleep    Daytime " Symptoms  On awakening patient reports: wake unrefreshed, morning headaches, and morning dry mouth    Patient report some daytime symptoms including:   Patient denies daytime symptoms including:     Fatigue: symptoms bothersome, but easily able to carry out all usual work/school/family activities    ESS: 7   UCHE: 17  FOSQ:  27      REVIEW OF SYSTEMS     REVIEW OF SYSTEMS  Review of Systems   All other systems reviewed and are negative.          ALLERGIES AND MEDICATIONS     ALLERGIES  Allergies   Allergen Reactions    Honey Hives    Neosporin (Neomycin-Polymyx) Rash       MEDICATIONS  Current Outpatient Medications   Medication Sig Dispense Refill    amLODIPine (Norvasc) 2.5 mg tablet Take 1 tablet (2.5 mg) by mouth once daily.      apixaban (Eliquis) 5 mg tablet Take 1 tablet (5 mg) by mouth 2 times a day. 180 tablet 3    cholecalciferol (Vitamin D-3) 5,000 Units tablet Vitamin D3 125 MCG (5000 UT) Oral Tablet   Refills: 0        Tl CANDELARIO, Michael FUNK   Start : 31-Aug-2020   Active      cyanocobalamin (Vitamin B-12) 500 mcg tablet Take by mouth.      hydroCHLOROthiazide (HYDRODiuril) 25 mg tablet Take 1 tablet (25 mg) by mouth once daily. 90 tablet 3    losartan (Cozaar) 100 mg tablet Take 1 tablet (100 mg) by mouth once daily. 90 tablet 3    tadalafil (Cialis) 10 mg tablet Take 1 tablet (10 mg) by mouth once daily as needed.      cyanocobalamin-cobamamide 5,000-100 mcg lozenge Place under the tongue once daily.      traZODone (Desyrel) 50 mg tablet Take 0.5 tablets (25 mg) by mouth once daily at bedtime.       No current facility-administered medications for this visit.         PAST HISTORY     PAST MEDICAL HISTORY  Past Medical History:   Diagnosis Date    Encounter for health counseling related to travel 01/14/2020    Counseling for travel    Personal history of urinary calculi     History of renal calculi    Unspecified dislocation of right acromioclavicular joint, initial encounter 03/11/2020    Separation  "of right acromioclavicular joint, initial encounter       PAST SURGICAL HISTORY:  Past Surgical History:   Procedure Laterality Date    OTHER SURGICAL HISTORY  02/10/2020    Appendectomy       FAMILY HISTORY  No family history on file.    DOES/DOES NOT EC: does have a family history of sleep disorder. Father snored       SOCIAL HISTORY  He  reports that he has never smoked. He has never used smokeless tobacco. He reports that he does not drink alcohol and does not use drugs. He currently lives alone.     Patient SOCIAL HISTORY : denies nicotine use  denies marijuana use  consumes 1 alcoholic beverages/week  consumes 3 caffeinated beverages/day    PHYSICAL EXAM     VITAL SIGNS: /64 (BP Location: Left arm, Patient Position: Sitting, BP Cuff Size: Adult)   Pulse 58   Ht 1.778 m (5' 10\")   Wt 71.7 kg (158 lb)   SpO2 98%   BMI 22.67 kg/m²      PREVIOUS WEIGHTS:  Wt Readings from Last 3 Encounters:   11/30/23 71.7 kg (158 lb)   11/20/23 71.6 kg (157 lb 14.4 oz)   10/17/23 73.5 kg (162 lb)       Physical Exam  Physical Exam   Constitutional: Alert and oriented, cooperative, no obvious distress   HEENT: Non icteric or anemic, EOM WNL bilaterally     Upper Airway Examination:   Modified Mallampati Class: 2  OP Lateral wall narrowing rdgrdrrdarddrderd:rd rd3rd Tonsil ndGndrndanddndend:nd nd2nd No high arched palate  Tongue Scalloping: Y mild  Retrognathia: N  Overjet: N    Neck: Supple, no JVD, no goiter, no adenopathy  Chest: CTA bilaterally, no wheezing, crackles, rubs   Cardiac: RRR, S1 and S2, no murmur, rub, thrill   Abdomen: Soft, nontender, no masses, no organomegaly   Extremities: No clubbing, no LL edema   Neuromuscular: Cranial nerves grossly intact, no focal deficits        RESULTS/DATA     Bicarbonate (mmol/L)   Date Value   06/26/2023 30   02/15/2023 35 (H)   10/28/2022 31         ASSESSMENT/PLAN     Mr. Oneal is a 63 y.o. male and He was referred to the [facility] for evaluation of snoring     Problem " List and Orders  Problem List Items Addressed This Visit             ICD-10-CM    Snoring - Primary R06.83     HSAT ordered for further work up  Discussed CPAP and dental appliance, notes his dentist makes Gomez. Will consider pending results of testing and apnea v hypopnea burden  He is agreeable and aware of my pending leave, will follow up with one of my colleagues PRN         Relevant Orders    Home sleep apnea test (HSAT)

## 2023-11-30 NOTE — PATIENT INSTRUCTIONS
[facility] Sleep Medicine  [facility] [address]  [facility]  [address]  [facility] [address]       NAME: Prasad Oneal   DATE:  11/30/23    DIAGNOSIS:   1. Snoring  Home sleep apnea test (HSAT)          Thank you for coming to the Sleep Medicine Clinic today! Your sleep medicine provider today was: GORAN Canela Below is a summary of your treatment plan, other important information, and our contact numbers:    TREATMENT PLAN:   - Get your sleep study scheduled  - Follow-up in 2-3 months.  - If not already done, sign up for 'My Chart' and send prescription requests or messages through this      Scheduling a Sleep Study    Call the  Sleep Testing Center to speak with a sleep testing  to book your overnight sleep study procedure at one of our adult and pediatric-friendly sleep labs. Overnight sleep studies may be scheduled on a weekday or weekend.    We have child life services on a case by case basis at the [facility] location. We also perform daytime testing for shift workers on a case by case basis.    Locations for sleep studies are: [facility], [facility] ([facility]), [facility], [facility], [place], [place].    Bring your usual medications and nightly routine items for your sleep study. In order to fall asleep faster in sleep lab, we advise patients to wake up earlier on the morning of the scheduled testing and avoid napping prior to testing. Sometimes, your provider may prescribe a sleep aid to be taken at lights out in the sleep lab. If you are taking a sleep aid, please have somebody pick you up after the sleep testing.    Results of your sleep study will be given to the ordering clinician. Please contact their office for results or follow up as directed by your clinician.    For additional information about the sleep medicine services, please call [phone]       Instructions - Common  AGUSTINA Recs: - For your sleep apnea, continue to use your PAP every night and use it whenever you are sleeping.   - Avoid alcohol or sedatives several hours prior to sleeping.   - Get additional supplies for your PAP (e.g., mask, hose, filters) every 3 months or as your insurance allows from your [employer] company. Replacement cushions for your PAP mask can be requested monthly if airseals are an issue.  - Remember to clean your mask, tubings, and water chamber regularly as instructed.  - Avoid driving or operating heavy machinery when drowsy. A person driving while sleepy is five (5) times more likely to have an accident. If you feel sleepy, pull over and take a short power nap (sleep for less than 30 minutes). Otherwise, ask somebody to drive you.    EASY WAYS TO IMPROVE YOUR SLEEP:  1. Go to bed and wake up at the same time every day.   Aim for 8 hours but some people need less, some need more.   Get out of bed if you are not sleeping.   Limit naps to 20 min or less.   2. Expose yourself to daylight and/ or bright light in the morning.   Go outside or spend time near a window each morning.   You can use a light box (found on Amazon) if you wake before the sunrise.   Limit light exposure in the evenings (including electronic usage).   Try meditation, reading, stretching, deep breathing, warm shower or bath, or yoga nidra as part of your bedtime routine. There are many great FREE, videos or audio tracks on YouTube/ Nibu, etc for guidance.  3. Exercise, in some form, EVERY day, but not too close to bedtime. Consider making this part of your routine at the start of your day, followed by a cool shower.  4. Eat meals at roughly the same time every day. Make sure you are prioritizing fruits, vegetables, whole grains, lean proteins.  5. Time your caffeine intake. Make sure you are not drinking caffeine within 8 to 12 hours prior to your bedtime.   6. Avoid marijuana, alcohol, and nicotine. They will reduce sleep quality in any  quantity.  7. Learn to manage anxiety. Psychology services at  can be reached at [phone] to schedule an appointment.     IMPORTANT INFORMATION:     Call 911 for medical emergencies.  Our offices are generally open from Monday-Friday, 9 am - 5 pm.  If you need to get in touch with me, you may either call me and my team(number is below) or you can use dINK.  If a referral for a test, for CPAP, or for another specialist was made, and you have not heard about scheduling this within a week, please call scheduling at [phone]).  If you are unable to make your appointment for clinic or an overnight study, kindly call the office at least 48 hours in advance to cancel and reschedule.  If you are on CPAP, please bring your device's card to each clinic appointment unless told otherwise by your provider.  There are no supporting services by either the sleep doctors or their staff on weekends and Holidays, or after 5 PM on weekdays.   If you have been asked to come to a sleep study, make sure you bring toiletries, a comfy pillow, and any nighttime medications that you may regularly take. Also be sure to eat dinner before you arrive. We generally do not provide meals.      PRESCRIPTIONS:  We require 7 days advanced notice for prescription refills. If we do not receive the request in this time, we cannot guarantee that your medication will be refilled in time. Please contact the sleep nurses listed below for refills or request via dINK.     IMPORTANT PHONE NUMBERS:   Sleep Medicine Clinic Fax: [phone]  Appointments (for Pediatric Sleep Clinic): [phone] - option 1  Appointments (for Adult Sleep Clinic): [phone] - option 2  Appointments (For Sleep Studies): [phone] - option 3  Behavioral Sleep Medicine: [phone]  Sleep Surgery: [phone]  ENT (Otolaryngology): [phone]  Headache Clinic (Neurology): [phone]  Neurology: [phone]  Psychiatry:  [phone]  Pulmonary Function Testing (PFT) Center: [phone]  Pulmonary Medicine: [phone]  [employer] (DME): [phone]  [employer] (DME): [phone]  [facility] ([facility]): [phone]    Our Adult Sleep Medicine Team (Please do not hesitate to call the office or sleep nurse with any questions between appointments):    Adult Sleep Nurses (Sylvia Boyd, RN and Kylee Reid RN):  For clinical questions and refilling prescriptions: [phone]  Email sleep diaries and other documents at: [email or web address]    Adult Sleep Medicine Secretaries:  Kristen Hernandez (For Susie/[place]/Dewey/Deanna/Samuel/Luke):   P: [phone]  F: [phone]  Jania Martin (For Moran/Elizabeth): P: [phone]  Fax: [phone]  Betty Meade (For Jurgina/Blank): P: [phone]  F: [phone]  Yaima Duval (For Tavon): P: [phone]  F: [phone]  Treva Nguyen (For Miroslava/Karissa/Zakhary): P: [phone]  F: [phone]  Kim Brar (For Patricio/Atif): P: [phone]  F: [phone]     Adult Sleep Medicine Advanced Practice Providers:  Jeffrey Lema ([place], [place])  Norma Hancock ([facility])  Юлия Johnson CNP ([place], [place], [place])  Bryanna Palomo CNP ([place], [place], [place])  Omayra Montes De Oca ([place], [place], [place])  Lupe Srivastava CNP ([facility])      Our Sleep Testing Center (STC) Locations:  Our team will contact you to schedule your sleep study, however, you can contact us as follow:  Main Phone Line (scheduling only): [phone]), option 3  Adult and Pediatric Locations  [facility] (6 years and older): [facility] - 4th floor ([address] [place] [address]) After hours line: [phone]  [facility] (Main campus: All ages): [facility], 6th floor. After hours line: [phone]  [facility] (5 years and older; younger considered on  "case-by-case basis): [address]; [address], [address]. Scheduling  After hours line: [phone]   [place] (6 years and older): [address]; [address]; [address]   [place] (6 years and older): [address] [facility], [address]  After hours line: [phone]   [place] (13 years and older) in [place]: [address], 2nd floor  After hours line: [phone]   [place] (13 year and older): [address]  After hours line: [phone] (Home studies out of [facility])    Adult Only Locations:   [place] (18 years and older): [address] [facility], 2nd floor   [place] (18 years and older): [address] [facility]; 4th floor  After hours line: [phone]  [facility] (18 years and older) at [place]: [address] [place] [address]  After hours line: [phone]        CONTACTING YOUR SLEEP MEDICINE PROVIDER:  Send a message directly to your provider through \"My Chart\", which is the email service through your  Records Account: [email or web address]   Call [phone] and leave a message. One of the administrative assistants will forward the message to your sleep medicine provider through \"My Chart\" and/or email.     Your sleep medicine provider for this visit was: Юлия Johnson, MAGNOLIA-CNP    In the event that you are running more than 15 minutes late to your appointment, I will kindly ask you to reschedule.       "

## 2023-12-15 DIAGNOSIS — F32.A DEPRESSION, UNSPECIFIED DEPRESSION TYPE: Primary | ICD-10-CM

## 2023-12-15 RX ORDER — ESCITALOPRAM OXALATE 10 MG/1
10 TABLET ORAL DAILY
Qty: 30 TABLET | Refills: 6 | Status: SHIPPED | OUTPATIENT
Start: 2023-12-15 | End: 2024-01-16 | Stop reason: SINTOL

## 2024-01-12 ENCOUNTER — LAB (OUTPATIENT)
Dept: LAB | Facility: LAB | Age: 64
End: 2024-01-12
Payer: COMMERCIAL

## 2024-01-12 DIAGNOSIS — I10 HYPERTENSION, UNSPECIFIED TYPE: ICD-10-CM

## 2024-01-12 LAB
ANION GAP SERPL CALC-SCNC: 11 MMOL/L (ref 10–20)
BUN SERPL-MCNC: 12 MG/DL (ref 6–23)
CALCIUM SERPL-MCNC: 9.7 MG/DL (ref 8.6–10.6)
CHLORIDE SERPL-SCNC: 104 MMOL/L (ref 98–107)
CHOLEST SERPL-MCNC: 101 MG/DL (ref 0–199)
CHOLESTEROL/HDL RATIO: 4.3
CO2 SERPL-SCNC: 31 MMOL/L (ref 21–32)
CREAT SERPL-MCNC: 0.79 MG/DL (ref 0.5–1.3)
EGFRCR SERPLBLD CKD-EPI 2021: >90 ML/MIN/1.73M*2
GLUCOSE SERPL-MCNC: 95 MG/DL (ref 74–99)
HDLC SERPL-MCNC: 23.7 MG/DL
LDLC SERPL CALC-MCNC: 41 MG/DL
NON HDL CHOLESTEROL: 77 MG/DL (ref 0–149)
POTASSIUM SERPL-SCNC: 4 MMOL/L (ref 3.5–5.3)
SODIUM SERPL-SCNC: 142 MMOL/L (ref 136–145)
TRIGL SERPL-MCNC: 180 MG/DL (ref 0–149)
VLDL: 36 MG/DL (ref 0–40)

## 2024-01-12 PROCEDURE — 80048 BASIC METABOLIC PNL TOTAL CA: CPT

## 2024-01-12 PROCEDURE — 80061 LIPID PANEL: CPT

## 2024-01-12 PROCEDURE — 36415 COLL VENOUS BLD VENIPUNCTURE: CPT

## 2024-01-12 ASSESSMENT — ENCOUNTER SYMPTOMS
ABDOMINAL PAIN: 1
FREQUENCY: 0
DIARRHEA: 1
HEMATURIA: 0
HEMATOCHEZIA: 0
ARTHRALGIAS: 0
BELCHING: 0
DYSURIA: 0
VOMITING: 0
MYALGIAS: 0
FLATUS: 1
WEIGHT LOSS: 1
CONSTIPATION: 0
ANOREXIA: 1
FEVER: 0
NAUSEA: 0
HEADACHES: 0

## 2024-01-16 ENCOUNTER — OFFICE VISIT (OUTPATIENT)
Dept: PRIMARY CARE | Facility: CLINIC | Age: 64
End: 2024-01-16
Payer: COMMERCIAL

## 2024-01-16 VITALS
RESPIRATION RATE: 16 BRPM | HEART RATE: 62 BPM | TEMPERATURE: 97.3 F | BODY MASS INDEX: 21.76 KG/M2 | DIASTOLIC BLOOD PRESSURE: 62 MMHG | SYSTOLIC BLOOD PRESSURE: 122 MMHG | WEIGHT: 152 LBS | OXYGEN SATURATION: 98 % | HEIGHT: 70 IN

## 2024-01-16 DIAGNOSIS — R10.32 LEFT LOWER QUADRANT ABDOMINAL PAIN: ICD-10-CM

## 2024-01-16 DIAGNOSIS — R01.1 HEART MURMUR: Primary | ICD-10-CM

## 2024-01-16 DIAGNOSIS — R41.3 MEMORY CHANGES: ICD-10-CM

## 2024-01-16 DIAGNOSIS — Z00.00 HEALTH MAINTENANCE EXAMINATION: ICD-10-CM

## 2024-01-16 DIAGNOSIS — C44.41 BASAL CELL CARCINOMA OF SKIN OF SCALP AND NECK: ICD-10-CM

## 2024-01-16 DIAGNOSIS — Z23 NEED FOR VACCINATION FOR STREP PNEUMONIAE: ICD-10-CM

## 2024-01-16 DIAGNOSIS — N20.0 CALCULUS OF KIDNEY: ICD-10-CM

## 2024-01-16 DIAGNOSIS — I10 HYPERTENSION, UNSPECIFIED TYPE: ICD-10-CM

## 2024-01-16 DIAGNOSIS — N52.2 DRUG-INDUCED ERECTILE DYSFUNCTION: ICD-10-CM

## 2024-01-16 DIAGNOSIS — R94.31 LONG QT INTERVAL: ICD-10-CM

## 2024-01-16 DIAGNOSIS — R91.8 PULMONARY NODULES: ICD-10-CM

## 2024-01-16 PROCEDURE — 99214 OFFICE O/P EST MOD 30 MIN: CPT | Performed by: INTERNAL MEDICINE

## 2024-01-16 PROCEDURE — 90686 IIV4 VACC NO PRSV 0.5 ML IM: CPT | Performed by: INTERNAL MEDICINE

## 2024-01-16 PROCEDURE — 90471 IMMUNIZATION ADMIN: CPT | Performed by: INTERNAL MEDICINE

## 2024-01-16 PROCEDURE — 3074F SYST BP LT 130 MM HG: CPT | Performed by: INTERNAL MEDICINE

## 2024-01-16 PROCEDURE — 1036F TOBACCO NON-USER: CPT | Performed by: INTERNAL MEDICINE

## 2024-01-16 PROCEDURE — 99396 PREV VISIT EST AGE 40-64: CPT | Performed by: INTERNAL MEDICINE

## 2024-01-16 PROCEDURE — 3078F DIAST BP <80 MM HG: CPT | Performed by: INTERNAL MEDICINE

## 2024-01-16 ASSESSMENT — ENCOUNTER SYMPTOMS
CONSTIPATION: 0
HEMATURIA: 0
FLATUS: 1
TREMORS: 0
DECREASED CONCENTRATION: 1
DIZZINESS: 0
FEVER: 0
UNEXPECTED WEIGHT CHANGE: 0
ANOREXIA: 1
VOMITING: 0
DYSPHORIC MOOD: 1
DIFFICULTY URINATING: 0
FREQUENCY: 0
AGITATION: 0
SHORTNESS OF BREATH: 1
ACTIVITY CHANGE: 0
BELCHING: 0
HEADACHES: 0
WEIGHT LOSS: 1
NAUSEA: 0
DYSURIA: 0
WEAKNESS: 0
HEMATOCHEZIA: 0
MYALGIAS: 0
PALPITATIONS: 0

## 2024-01-16 NOTE — PROGRESS NOTES
Subjective   Patient ID: Kenn Oneal is a 63 y.o. male who presents for Med Refill.    Med Refill  Associated symptoms include anorexia and chest pain (after exercise). Pertinent negatives include no fever, headaches, myalgias, nausea, vomiting or weakness.   Abdominal Pain  This is a new problem. The current episode started 1 to 4 weeks ago. The onset quality is gradual. The problem occurs daily. The most recent episode lasted 1 hours. The problem has been unchanged. The pain is located in the LLQ and periumbilical region. The pain is at a severity of 3/10. The quality of the pain is cramping and a sensation of fullness. The abdominal pain does not radiate. Associated symptoms include anorexia, flatus and weight loss. Pertinent negatives include no belching, constipation, dysuria, fever, frequency, headaches, hematochezia, hematuria, melena, myalgias, nausea or vomiting. The pain is aggravated by eating. The pain is relieved by Bowel movements and passing flatus.    62 yo wm for annual visit      Exercise daily  Employment patient still works as a   Review of Systems   Constitutional:  Positive for weight loss. Negative for activity change, fever and unexpected weight change.   HENT: Negative.     Eyes:  Positive for visual disturbance (cataracts).   Respiratory:  Positive for shortness of breath.    Cardiovascular:  Positive for chest pain (after exercise). Negative for palpitations and leg swelling.   Gastrointestinal:  Positive for anorexia and flatus. Negative for constipation, hematochezia, melena, nausea and vomiting.   Genitourinary:  Negative for difficulty urinating, dysuria, frequency and hematuria.   Musculoskeletal:  Negative for myalgias.   Neurological:  Negative for dizziness, tremors, syncope, weakness and headaches.   Psychiatric/Behavioral:  Positive for decreased concentration and dysphoric mood. Negative for agitation.      His workup for dementia shows some small vessel ischemic  "changes is really not had progressive symptoms.  He will be following up with neurology and psychology  Objective   /62 (BP Location: Right arm, Patient Position: Sitting)   Pulse 62   Temp 36.3 °C (97.3 °F)   Resp 16   Ht 1.778 m (5' 10\")   Wt 68.9 kg (152 lb)   SpO2 98%   BMI 21.81 kg/m²     Physical Exam  Constitutional:       Appearance: Normal appearance. He is normal weight.   HENT:      Right Ear: Ear canal normal.      Left Ear: Ear canal normal.   Eyes:      Conjunctiva/sclera: Conjunctivae normal.   Neck:      Vascular: No carotid bruit.   Cardiovascular:      Rate and Rhythm: Regular rhythm.   Pulmonary:      Breath sounds: Normal breath sounds.   Abdominal:      General: Abdomen is flat. Bowel sounds are normal. There is no distension.      Palpations: Abdomen is soft. There is no mass.      Tenderness: There is abdominal tenderness. There is guarding. There is no rebound.      Hernia: No hernia is present.      Comments: Abdomen is flat soft left lower quadrant tenderness with guarding no rebound rigidity bowel sounds positive   Genitourinary:     Penis: Normal.       Testes: Normal.   Musculoskeletal:         General: No swelling or deformity.      Cervical back: No rigidity or tenderness.   Lymphadenopathy:      Cervical: No cervical adenopathy.   Neurological:      General: No focal deficit present.      Mental Status: He is alert and oriented to person, place, and time.   Psychiatric:         Mood and Affect: Mood normal.         Behavior: Behavior normal.         Thought Content: Thought content normal.         Judgment: Judgment normal.         Assessment/Plan   Diagnoses and all orders for this visit:  Heart murmur followed by cardiology  Hypertension, unspecified type  Long QT interval followed by cardiac  Calculus of kidney  -     CT abdomen and pelvis w oral contrast only; Future  Drug-induced erectile dysfunction  Cialis as needed  Basal cell carcinoma of skin of scalp and " neck  Memory changes continue with neurology and psychology follow-up workup was consistent with small vessel ischemic changes we will continue to reduce your risk factors by controlling blood pressure and keeping your readings under 130/80 your LDL cholesterol under 70  Pulmonary nodules  Need for vaccination for Strep pneumoniae  Health maintenance examination  -     pneumoc 20-torey conj-dip cr,PF, (Prevnar) 0.5 mL vaccine; Inject 0.5 mL into the muscle 1 time for 1 dose.  -     Hepatitis C antibody; Future  -     Prostate Specific Antigen; Future  -     Referral to Audiology; Future  Left lower quadrant abdominal pain  Differential diagnosis diverticulitis kidney stone  -     CT abdomen and pelvis w oral contrast only; Future  Other orders  -     Flu vaccine (IIV4) age 6 months and greater, preservative free  Annual follow-up

## 2024-01-18 ENCOUNTER — CLINICAL SUPPORT (OUTPATIENT)
Dept: AUDIOLOGY | Facility: CLINIC | Age: 64
End: 2024-01-18
Payer: COMMERCIAL

## 2024-01-18 DIAGNOSIS — H93.13 TINNITUS OF BOTH EARS: ICD-10-CM

## 2024-01-18 DIAGNOSIS — H93.293 ABNORMAL AUDITORY PERCEPTION OF BOTH EARS: Primary | ICD-10-CM

## 2024-01-18 DIAGNOSIS — Z00.00 HEALTH MAINTENANCE EXAMINATION: ICD-10-CM

## 2024-01-18 PROCEDURE — 92567 TYMPANOMETRY: CPT | Performed by: AUDIOLOGIST

## 2024-01-18 PROCEDURE — 92557 COMPREHENSIVE HEARING TEST: CPT | Performed by: AUDIOLOGIST

## 2024-01-18 NOTE — RESULT ENCOUNTER NOTE
LDL looks good, though your triglycerides are high. Would continue to reduced any processed carbohydrates ( any baked goods, breads, alcohol etc) to improve triglycerides. No change in medications at this time.

## 2024-01-18 NOTE — PROGRESS NOTES
"AUDIOLOGIC EVALUATION    Name:  Prasad Oneal \"Kenn\"  :  1960  Age:  63 y.o.    HISTORY:     Patient was referred by his primary care physician for hearing evaluation.  Throughout the last year he has noted difficulty with clearly understanding speech especially in the presence of background noise and social settings.  He denied pain, pressure, and vertigo.  He has a long history of nonbothersome tinnitus.    EVALUATION:    See Audiogram.    RESULTS:    Otoscopy:  Right: Clear canal, normal color and appearance of tympanic membrane.  Left: Clear canal, normal color and appearance of tympanic membrane.    Tympanometry:  Right: Normal tympanic membrane mobility and middle ear pressure.  Left: Normal tympanic membrane mobility and middle ear pressure.    Hearing Sensitivity:  Right: Hearing within normal limits.  Left: Hearing within normal limits.    Word Recognition Score (NU-6 ordered 1-2):  Right: Excellent (100%) at 55 dB.  Left: Excellent (100%) at 55 dB.        ASSESSMENT AND PLAN:    - Continue medical follow-up with PCP.  - Counseled regarding auditory processing, communication strategies, and considerations for further evaluation or management of these concerns. Discussed auditory training programs including Listening and Communication Enhancement (LACE) and AmpToovariy (boris based auditory training games).  - Monitor and recheck as warranted.  - Counseled regarding results and recommendations.      KENNY Harris./B.S.  Audiology Student Extern    Prosper Glass  Clinical Audiologist    "

## 2024-02-21 ENCOUNTER — PROCEDURE VISIT (OUTPATIENT)
Dept: SLEEP MEDICINE | Facility: CLINIC | Age: 64
End: 2024-02-21
Payer: COMMERCIAL

## 2024-02-21 DIAGNOSIS — R06.83 SNORING: ICD-10-CM

## 2024-02-21 DIAGNOSIS — G47.30 SLEEP APNEA, UNSPECIFIED: ICD-10-CM

## 2024-02-21 PROCEDURE — 95806 SLEEP STUDY UNATT&RESP EFFT: CPT | Performed by: INTERNAL MEDICINE

## 2024-02-21 NOTE — PROGRESS NOTES
Type of Study: HOME SLEEP STUDY - NOMAD     The patient received equipment and instructions for use of the [facility] Nomad HSAT device. The patient was instructed how to apply the effort belts, cannula, thermistor. It was also explained how the Nomad and oximeter components work.  The patient was asked to record their sleep for an 8-hour period.     The patient was informed of their responsibility for the device and acknowledged this by signing the HSAT device contract. The patient was asked to return the device on 02/22/2024 between the hours of 08:00 - 15:00  to the Sleep Center.     The patient was instructed to call 911 as usual for any medical- emergencies while at home.  The patient was also given a phone number for troubleshooting when using the device in case there were additional questions.

## 2024-03-22 NOTE — PROGRESS NOTES
"       [facility]  [facility]  [address]  [facility] [id number] [phone]     [facility]  Follow Up Visit Note    Virtual or Telephone Consent    An interactive audio and video telecommunication system which permits real time communications between the patient (at the originating site) and provider (at the distant site) was utilized to provide this telehealth service.   Verbal consent was requested and obtained from Prasad Oneal on this date, 03/25/24 for a telehealth visit.        Subjective   Patient ID: Prasad Oneal \"Ketty" is a 63 y.o. male with past medical history significant for heart murmur, Hypertension, LVH, long QT interval, hernia, basal cell carcinoma, depression, arthritis, pulmonary nodules, insomnia.     3/25/24: UPDATED: The patient had a virtual visit with me to review his Home Sleep Study, which was completed on 2/21/24 and revealed no significant sleep disorder breathing. However, the ROBERT score doubled in the supine position. I discussed this with him in the clinic. Since he is symptomatic, he agreed to do another in-lab sleep study and keep the supine position in the sleep test to prevent false negative results. His ESS is 6, and IS:17  today.    11/30/23: On today's visit, the patient reports ongoing snoring, gasping choking. Was encouraged by his PCP and cardiology to have sleep study completed. He does endorse sleepiness at times during the day, memory and concentration troubles, irritability. He sometimes wakes in the night, sometimes hard to get back to sleep. Dry mouth, grinds his teeth. ESS: 7, UCHE: 17, and FOSQ:  27 (By Юлия Johnson CNP)     Patient was last seen  Юлия Johnson CNP in 11/30/23. Since last visit, patient denies symptoms of cataplexy, RLS, parasomnias, and shift-work disorder.       SLEEP STUDY HISTORY: (personally reviewed raw data such as " "interpretation report, data sheet, hypnogram, and titration table if available and applicable)  2/21/24: Home Sleep Study: ROBERT 3%: 4.4/hr, Supine: ROBERT 3%: 8.8/hr, Seng: 83%     HISTORY OF PRESENT ILLNESS      The patient's referring provider is: Dr. Armida Ying     HISTORY OF PRESENT ILLNESS   Prasad Oneal \"Ketty" is a 63 y.o. male who presents to a [facility] for a sleep medicine evaluation with concerns of suspected sleep apnea. He is a .      Past Sleep History  Patient has the following sleep-related diagnoses: none    Sleep schedule  on weekdays / work days:  Usual Bedtime:  11pm     Falls asleep around:  30 min   # of awakenings: 1x per night  Wake time:  5-6 AM     Naps: afternoon for 30 min or so     Preferred sleeping position: side     Sleep-related ROS:     Problems going to sleep: No problems going to sleep     Sleep Maintenance: wakes up about 1 times per night  Problems staying asleep Problems Staying Asleep: breathing problems and no clear reason     Breathing during sleep: snoring, snorting during sleep, witnessed apneas, and gasping/choking for air     RLS screen:  RLSSCREEN: - Sensations: Patient does not have unusual sensations in their extremities that cause an urge to move them      Sleep-related behaviors:   dreams upon falling asleep     Daytime Symptoms  On awakening patient reports: wake unrefreshed, morning headaches, and morning dry mouth     Patient report some daytime symptoms including:   Patient denies daytime symptoms including:      Fatigue: symptoms bothersome, but easily able to carry out all usual work/school/family activities           REVIEW OF SYSTEMS      REVIEW OF SYSTEMS  Review of Systems   All other systems reviewed and are negative.         PHYSICAL EXAM      VITAL SIGNS: /64 (BP Location: Left arm, Patient Position: Sitting, BP Cuff Size: Adult)   Pulse 58   Ht 1.778 m (5' 10\")   Wt 71.7 kg (158 lb)   SpO2 98% " "  BMI 22.67 kg/m²       PREVIOUS WEIGHTS:      Wt Readings from Last 3 Encounters:   11/30/23 71.7 kg (158 lb)   11/20/23 71.6 kg (157 lb 14.4 oz)   10/17/23 73.5 kg (162 lb)         Physical Exam  Physical Exam   Constitutional: Alert and oriented, cooperative, no obvious distress   HEENT: Non icteric or anemic, EOM WNL bilaterally      Upper Airway Examination:   Modified Mallampati Class: 2  OP Lateral wall narrowing rdgrdrrdarddrderd:rd rd3rd Tonsil ndGndrndanddndend:nd nd2nd No high arched palate  Tongue Scalloping: Y mild  Retrognathia: N  Overjet: N     Neck: Supple, no JVD, no goiter, no adenopathy  Neuromuscular: Cranial nerves grossly intact, no focal deficits         RESULTS/DATA          Bicarbonate (mmol/L)   Date Value   06/26/2023 30   02/15/2023 35 (H)   10/28/2022 31            ASSESSMENT/PLAN      Mr. Oneal is a 63 y.o. male and He was referred to the [facility] for evaluation of snoring      REVIEW OF SYSTEMS: All other systems have been reviewed and are negative.    PERTINENT SOCIAL HISTORY:  Occupation: work from home,    Smoking: No   ETOH: 1 alcoholic beverages/week  Marijuana: No   Caffeine: consumes 3 caffeinated beverages/day  Sleep aids: No   Claustrophobia: No       PERTINENT FAMILY HISTORY:  loud snoring- father    Active Problems, Allergy List, Medication List, and PMH/PSH/FH/Social Hx have been reviewed and reconciled in chart. No significant changes unless documented in the pertinent chart section. Updates made when necessary.       Objective   Physical Exam  Constitutional: Awake, not in distress  Psych: alert and oriented to time, place, and person    Assessment/Plan   Prasad Oneal \"Kenn\" is a 63 y.o. male presents today in [facility] with the following problems:    # SLEEP DISORDERED BREATHING: ROBERT 3%: 4.4/hr, Supine: ROBERT 3%: 8.8/hr  -Discuss with Kenn to do an in-lab PSG in all supine sleep to prevent a false negative " result and keep a prone position at the sleep study. He agrees with it.  - We consider treatment as indicated when testing is complete.     RTC 2-3 weeks after sleep study    All of patient's questions were answered. He verbalizes understanding and agreement with my assessment and plan.

## 2024-03-22 NOTE — PATIENT INSTRUCTIONS
"       [facility]  [facility]  [address]  [facility] [address] [phone]       Thank you for coming to the Sleep Medicine Clinic today! Your sleep medicine provider today was: GORAN Jacob Below is a summary of your treatment plan, patient education, other important information, and our contact numbers.    Dear Prasad Oneal \"Kenn\",    Thank you for visiting  Sleep Medicine Clinic !     1. We will proceed with an IN-CENTER sleep study to check your risk of sleep apnea. The lab will call you and schedule it for you.     2. Please do not drive when you are sleepy and  start practicing the sleep hygiene  as discussed in clinic today.     3. FOR QUESTIONS AND CONCERNS:   a) : To schedule, cancel, or reschedule SLEEP STUDY appointments, please call [phone]  b): Please call my office with issues or questions: [phone] ([place]); [phone] ([email or web address])    If you have a CPAP or BiPAP machine at home, please bring the unit and all accessories including the power cord to your appointments unless I tell you otherwise. Please have knowledge of the DME company you worked with to receive your PAP device. If you have copies of any previous sleep testing completed outside of , please bring with you to clinic as well. This information will make our visits more productive.     If you are new to CPAP or BiPAP, please note the minimum usage insurance requires to continuing coverage for the equipment as noted by your DME company. Please discuss equipment issues (PAP unit, mask fit, humidification, etc.) with your DME company first.       In the event that you are running more than 10 minutes late to your appointment, I will kindly ask you to reschedule. Thanks.      TREATMENT PLAN     Follow-up Appointment:   Follow-up in 2-3 weeks after sleep study.    PATIENT EDUCATION     Obstructive Sleep Apnea (AGUSTINA) is a sleep disorder " where your upper airway muscles relax during sleep and the airway intermittently and repetitively narrows and collapses leading to partially blocked airway (hypopnea) or completely blocked airway (apnea) which, in turn, can disrupt breathing in sleep, lower oxygen levels while you sleep and cause night time wakings. Because both apnea and hypopnea may cause higher carbon dioxide or low oxygen levels, untreated AGUSTINA can lead to heart arrhythmia, elevation of blood pressure, and make it harder for the body to consolidate memory and facilitate metabolism (leading to higher blood sugars at night). Frequent partial arousals occur during sleep resulting in sleep deprivation and daytime sleepiness. AGUSTINA is associated with an increased risk of cardiovascular disease, stroke, hypertension, and insulin resistance. Moreover, untreated AGUSTINA with excessive daytime sleepiness can increase the risk of motor vehicular accidents.    Some conservative strategies for AGUSTINA regardless of AGUSTINA severity are:   Positional therapy - Avoid sleeping on your back.   Healthy diet and regular exercise to optimize weight is highly encouraged.   Avoid alcohol late in the evening and sedative-hypnotics as these substances can make sleep apnea worse.   Improve breathing through the nose with intranasal steroid spray, saline rinse, or antihistamines    Safety: Avoid driving vehicle and operating heavy equipment while sleepy. Drowsy driving may lead to life-threatening motor vehicle accidents. A person driving while sleepy is 5 times more likely to have an accident. If you feel sleepy, pull over and take a short power nap (sleep for less than 30 minutes). Otherwise, ask somebody to drive you.    Treatment options for sleep apnea include weight management, positional therapy, Positive Airway Therapy (PAP) therapy, oral appliance therapy, hypoglossal nerve stimulator (Inspire) and select airway surgeries.    Sleep Testing for sleep apnea: The best and ideal  way to check out if you have sleep apnea is to do an overnight sleep study in the sleep laboratory. Alternatively, a home sleep apnea test can also be done depending on your insurance and risk factors.     If you are having a home sleep apnea test, kindly allot 1 hour during pickup of the testing kit as you will have to complete paperwork and listen to the sleep technician for in-person on-the-spot demonstration and instructions on how to hook up the testing kit at home. Do the test for 1 day and start off with sleeping on your back. If you sleep on your side in the middle of night or you have always been a side or stomach-sleeper, it is ok as long as you have some time on your back and off-back.     If you are having an overnight in-lab sleep study, please make sure to bring toiletries, a comfy pillow, additional warm blankets, and any nighttime medications (include as-needed inhaler, pain pill, etc) that you may regularly take. Also, be sure to eat dinner before you arrive as we generally do not provide meals inside the sleep testing center. Lastly, in order to fall asleep faster in the sleep testing center, we advise patients to wake up 2 hours earlier on the morning of scheduled testing and avoid napping 2 days prior testing. Sometimes, your sleep provider may prescribe a sleep aid to be taken at lights out in the sleep testing center. If you are taking a sleep aid, consider having somebody pick you up after the sleep testing.    Overnight sleep studies may be scheduled on a weekday or weekend. We also perform daytime testing for shift workers on a case-by-case basis.    Once you have booked an appointment to do the sleep study, please contact my office for follow-up visit to discuss results.    On the other hand, if you have any of the following, please consider calling the sleep testing center to RESCHEDULE your sleep study appointment:  If you tested positive for COVID within 10 days of your sleep study  appointment.  If you were exposed to somebody who was confirmed for COVID within 10 days of your sleep study appointment and now you are having symptoms of possible COVID  If you have fever>100F or any acute symptoms that you think will lead to poor sleep during testing (e.g. new or worsening stuffy nose not relieved by steroid nasal spray)  If you have traveled domestically or internationally in the last month and now you are having symptoms of possible COVID    You can also go to the following EDUCATION WEBSITES for further information:   American Academy of Sleep Medicine http://sleepeducation.org  National Sleep Foundation: https://sleepfoundation.org  American Sleep Apnea Association: https://www.sleepapnea.org (for patients with sleep apnea)  Narcolepsy Network: https://www.narcolepsynetwork.org (for patients with narcolepsy)  [employer]: https://www.Nimialepsy.org (for patients with narcolepsy)  Hypersomnia Foundation: https://www.hypersomniafoundation.org (for patients with idiopathic hypersomnia)  RLS foundation: https://www.rls.org (for patients with restless leg syndrome)    IMPORTANT INFORMATION     Call 911 for medical emergencies.  Our offices are generally open from Monday-Friday, 8 am - 5 pm.   There are no supporting services by either the sleep doctors or their staff on weekends and Holidays, or after 5 PM on weekdays.   If you need to get in touch with me, you may either call my office number or you can use [employer].  If a referral for a test, for CPAP, or for another specialist was made, and you have not heard about scheduling this within a week, please call scheduling at [phone] ([phone]).  If you are unable to make your appointment for clinic or an overnight study, kindly call the office or sleep testing center at least 48 hours in advance to cancel and reschedule.  If you are on CPAP, please bring your device's card and/or the device to each clinic appointment.   In case of  problems with PAP machine or mask interface, please contact your DME (Durable Medical Equipment) company first. DME is the company who provides you the machine and/or PAP supplies.       PRESCRIPTIONS     We require 7 days advanced notice for prescription refills. If we do not receive the request in this time, we cannot guarantee that your medication will be refilled in time.    IMPORTANT PHONE NUMBERS     Sleep Medicine Clinic Fax: [phone]  Appointments (for Pediatric Sleep Clinic): [phone] - option 1  Appointments (for Adult Sleep Clinic): [phone] - option 2  Appointments (For Sleep Studies): [phone] - option 3  Behavioral Sleep Medicine: [phone]  Sleep Surgery: [phone]  Nutrition Service: [phone]  Weight management clinics with endocrinology: [phone]  Bariatric Services: [phone] (includes weight loss medications and weight loss surgery)  [facility]: [phone] (offers holistic approaches to weight management)  ENT (Otolaryngology): [phone]  Headache Clinic (Neurology): [phone]  Neurology: [phone]  Psychiatry: [phone]  Pulmonary Function Testing (PFT) Center: [phone]  Pulmonary Medicine: [phone]  Medical Service Company (DME): [phone]      OUR SLEEP TESTING LOCATIONS     Our team will contact you to schedule your sleep study, however, you can contact us as follow:  Main Phone Line (scheduling only): [phone]), option 3  Adult and Pediatric Locations  [facility] (6 years and older): [facility] - 4th floor ([address] [place] [address]) After hours line: [phone]  [facility] at [facility] (Main campus: All ages): [facility], 6th floor. After hours line: [phone]  [facility] (5 years and older; younger considered on case-by-case basis): [address] [facility]; [employer] [address], [address]. Scheduling  After hours line:  "[phone]   [place] (6 years and older): [address]; [address]; [address]   [place] (6 years and older): [address], [address]  After hours line: [phone]   [place] (13 years and older) in [place]: [address], 2nd floor  After hours line: [phone]   [place] (13 year and older): [address]  After hours line: [phone]     Adult Only Locations:   [place] (18 years and older): [address] [facility], 2nd floor   [place] (18 years and older): [address] [facility]; 4th floor  After hours line: [phone]  [facility] (18 years and older) at [place]: [id number] [place] [address]  After hours line: [phone]     CONTACTING YOUR SLEEP MEDICINE PROVIDER AND SLEEP TEAM      For issues with your machine or mask interface, please call your DME provider first. DME stands for durable medical company. DME is the company who provides you the machine and/or PAP supplies / accessories.   To schedule, cancel, or reschedule SLEEP STUDY APPOINTMENTS, please call the Main Phone Line at [phone] - option 3.   To schedule, cancel, or reschedule CLINIC APPOINTMENTS, you can do it in \"MyChart\", call [phone] to speak with my  (Kim Brar), or call the Main Phone Line at [phone] - option 2  For CLINICAL QUESTIONS or MEDICATION REFILLS, please call direct line for Adult Sleep Nurses at [phone].   Lastly, you can also send a message directly to your provider through \"My Chart\", which is the email service through your  Records Account: [email or web address]       Here at [facility], we wish you a restful sleep!   "

## 2024-03-25 ENCOUNTER — OFFICE VISIT (OUTPATIENT)
Dept: SLEEP MEDICINE | Facility: HOSPITAL | Age: 64
End: 2024-03-25
Payer: COMMERCIAL

## 2024-03-25 DIAGNOSIS — G47.30 SLEEP-RELATED BREATHING DISORDER: Primary | ICD-10-CM

## 2024-03-25 PROCEDURE — 99213 OFFICE O/P EST LOW 20 MIN: CPT

## 2024-03-25 PROCEDURE — 1036F TOBACCO NON-USER: CPT

## 2024-03-25 ASSESSMENT — SLEEP AND FATIGUE QUESTIONNAIRES
SLEEP_PROBLEM_NOTICEABLE_TO_OTHERS: A LITTLE
HOW LIKELY ARE YOU TO NOD OFF OR FALL ASLEEP WHILE SITTING AND TALKING TO SOMEONE: WOULD NEVER DOZE
DIFFICULTY_STAYING_ASLEEP: SEVERE
SITING INACTIVE IN A PUBLIC PLACE LIKE A CLASS ROOM OR A MOVIE THEATER: WOULD NEVER DOZE
HOW LIKELY ARE YOU TO NOD OFF OR FALL ASLEEP IN A CAR, WHILE STOPPED FOR A FEW MINUTES IN TRAFFIC: WOULD NEVER DOZE
DIFFICULTY_FALLING_ASLEEP: MODERATE
HOW LIKELY ARE YOU TO NOD OFF OR FALL ASLEEP WHEN YOU ARE A PASSENGER IN A CAR FOR AN HOUR WITHOUT A BREAK: SLIGHT CHANCE OF DOZING
WAKING_TOO_EARLY: SEVERE
HOW LIKELY ARE YOU TO NOD OFF OR FALL ASLEEP WHILE LYING DOWN TO REST IN THE AFTERNOON WHEN CIRCUMSTANCES PERMIT: MODERATE CHANCE OF DOZING
HOW LIKELY ARE YOU TO NOD OFF OR FALL ASLEEP WHILE SITTING AND READING: SLIGHT CHANCE OF DOZING
WORRIED_DISTRESSED_DUE_TO_SLEEP: MUCH
HOW LIKELY ARE YOU TO NOD OFF OR FALL ASLEEP WHILE SITTING QUIETLY AFTER LUNCH WITHOUT ALCOHOL: WOULD NEVER DOZE
SATISFACTION_WITH_CURRENT_SLEEP_PATTERN: DISSATISFIED
ESS-CHAD TOTAL SCORE: 6
SLEEP_PROBLEM_INTERFERES_DAILY_ACTIVITIES: SOMEWHAT
HOW LIKELY ARE YOU TO NOD OFF OR FALL ASLEEP WHILE WATCHING TV: MODERATE CHANCE OF DOZING

## 2024-03-25 ASSESSMENT — PATIENT HEALTH QUESTIONNAIRE - PHQ9
2. FEELING DOWN, DEPRESSED OR HOPELESS: NOT AT ALL
1. LITTLE INTEREST OR PLEASURE IN DOING THINGS: NOT AT ALL
SUM OF ALL RESPONSES TO PHQ9 QUESTIONS 1 AND 2: 0

## 2024-04-02 DIAGNOSIS — Z79.01 CHRONIC ANTICOAGULATION: ICD-10-CM

## 2024-04-02 DIAGNOSIS — I10 HYPERTENSION, UNSPECIFIED TYPE: Primary | ICD-10-CM

## 2024-04-04 ENCOUNTER — TELEPHONE (OUTPATIENT)
Dept: NEUROLOGY | Facility: CLINIC | Age: 64
End: 2024-04-04
Payer: COMMERCIAL

## 2024-04-04 DIAGNOSIS — F32.A DEPRESSION, UNSPECIFIED DEPRESSION TYPE: Primary | ICD-10-CM

## 2024-04-04 RX ORDER — ESCITALOPRAM OXALATE 10 MG/1
TABLET ORAL
Qty: 45 TABLET | Refills: 3 | Status: SHIPPED | OUTPATIENT
Start: 2024-04-04

## 2024-04-04 NOTE — TELEPHONE ENCOUNTER
----- Message from Selma Thomas MA sent at 4/4/2024  2:17 PM EDT -----  Patient pharmacy sent over a Medication request for Escitalopram 10 mg, medication isn't on patient med list. I also spoke with patient stated Dr. Hermes jansen in January. I read clinical summary doesn't even speak  about medication in notes. Please Advise

## 2024-04-04 NOTE — TELEPHONE ENCOUNTER
I spoke w/ patient, there was a SE with sertraline but not escitalopram which he continues to take and would like a refill.    Escitalopram 10mg 1/2 tab once daily

## 2024-04-15 RX ORDER — AMLODIPINE BESYLATE 2.5 MG/1
2.5 TABLET ORAL DAILY
Qty: 90 TABLET | Refills: 3 | Status: SHIPPED | OUTPATIENT
Start: 2024-04-15 | End: 2025-04-15

## 2024-04-15 RX ORDER — HYDROCHLOROTHIAZIDE 25 MG/1
25 TABLET ORAL DAILY
Qty: 90 TABLET | Refills: 3 | Status: SHIPPED | OUTPATIENT
Start: 2024-04-15 | End: 2025-04-15

## 2024-04-15 RX ORDER — LOSARTAN POTASSIUM 100 MG/1
100 TABLET ORAL DAILY
Qty: 90 TABLET | Refills: 3 | Status: SHIPPED | OUTPATIENT
Start: 2024-04-15 | End: 2025-04-15

## 2024-04-18 ENCOUNTER — APPOINTMENT (OUTPATIENT)
Dept: CARDIOLOGY | Facility: HOSPITAL | Age: 64
End: 2024-04-18
Payer: COMMERCIAL
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
## 2024-04-22 ENCOUNTER — OFFICE VISIT (OUTPATIENT)
Dept: CARDIOLOGY | Facility: HOSPITAL | Age: 64
End: 2024-04-22
Payer: COMMERCIAL

## 2024-04-22 VITALS
HEIGHT: 70 IN | SYSTOLIC BLOOD PRESSURE: 115 MMHG | BODY MASS INDEX: 22.28 KG/M2 | HEART RATE: 57 BPM | OXYGEN SATURATION: 100 % | DIASTOLIC BLOOD PRESSURE: 72 MMHG | WEIGHT: 155.6 LBS

## 2024-04-22 DIAGNOSIS — Z79.01 CHRONIC ANTICOAGULATION: ICD-10-CM

## 2024-04-22 DIAGNOSIS — I35.1 NONRHEUMATIC AORTIC VALVE INSUFFICIENCY: ICD-10-CM

## 2024-04-22 DIAGNOSIS — R06.83 SNORING: ICD-10-CM

## 2024-04-22 DIAGNOSIS — I48.0 PAF (PAROXYSMAL ATRIAL FIBRILLATION) (MULTI): ICD-10-CM

## 2024-04-22 DIAGNOSIS — I10 HYPERTENSION, UNSPECIFIED TYPE: ICD-10-CM

## 2024-04-22 DIAGNOSIS — R01.1 HEART MURMUR: Primary | ICD-10-CM

## 2024-04-22 PROCEDURE — 3074F SYST BP LT 130 MM HG: CPT | Performed by: INTERNAL MEDICINE

## 2024-04-22 PROCEDURE — 99214 OFFICE O/P EST MOD 30 MIN: CPT | Performed by: INTERNAL MEDICINE

## 2024-04-22 PROCEDURE — 3078F DIAST BP <80 MM HG: CPT | Performed by: INTERNAL MEDICINE

## 2024-04-22 PROCEDURE — 1036F TOBACCO NON-USER: CPT | Performed by: INTERNAL MEDICINE

## 2024-04-22 NOTE — PATIENT INSTRUCTIONS
1. Hypertension- please check your blood pressure and heart rate daily and log results for review. To continue  losartan 100 mg daily and HCTZ 25 mg daily BP appears adequatley controlled.   2. Aortic insufficiency ( leaking aortic valve) - moderate by echo, mild to moderate by MRI. Follow up echo in September 2023 difficult to grade AI though LV size remains normal. Totally asymptomatic at this point. Follow up echo due September 2024  3. Atypical chest pain-no inducible ischemia by stress cardiac MRI. Totally resolved.  4. Palpitations- pt found to have PAF with [id number] score of 1. Chronic anticoagulation- continue eliquis 5 mg bid. Most recent 2 week monitor( 10/2023)  without any AFIb, and no palpitations. To continue current regimen for now. If further palpitations can consider ablation in the future.   5. Pt with snoring and insomnia with AFIb-  in lab sleep study scheduled to be done soon to assess for possible AGUSTINA. Untreated sleep apnea may also make it difficult to control your blood pressure.  6. Lipids well controlled on plant based diet.  7. Continue current level of exercise.   Return to clinic in 6 months

## 2024-06-14 ENCOUNTER — LAB (OUTPATIENT)
Dept: LAB | Facility: LAB | Age: 64
End: 2024-06-14
Payer: COMMERCIAL

## 2024-06-14 DIAGNOSIS — Z00.00 HEALTH MAINTENANCE EXAMINATION: ICD-10-CM

## 2024-06-14 LAB
HCV AB SER QL: NONREACTIVE
PSA SERPL-MCNC: 0.21 NG/ML

## 2024-06-14 PROCEDURE — 84153 ASSAY OF PSA TOTAL: CPT

## 2024-06-14 PROCEDURE — 36415 COLL VENOUS BLD VENIPUNCTURE: CPT

## 2024-06-14 PROCEDURE — 86803 HEPATITIS C AB TEST: CPT

## 2024-06-17 ENCOUNTER — APPOINTMENT (OUTPATIENT)
Dept: PRIMARY CARE | Facility: CLINIC | Age: 64
End: 2024-06-17
Payer: COMMERCIAL

## 2024-06-17 VITALS
WEIGHT: 149 LBS | SYSTOLIC BLOOD PRESSURE: 127 MMHG | OXYGEN SATURATION: 98 % | DIASTOLIC BLOOD PRESSURE: 67 MMHG | BODY MASS INDEX: 21.38 KG/M2 | HEART RATE: 60 BPM

## 2024-06-17 DIAGNOSIS — I10 PRIMARY HYPERTENSION: ICD-10-CM

## 2024-06-17 DIAGNOSIS — R01.1 HEART MURMUR: ICD-10-CM

## 2024-06-17 DIAGNOSIS — N48.1 BALANITIS: ICD-10-CM

## 2024-06-17 DIAGNOSIS — N48.1 BALANITIS: Primary | ICD-10-CM

## 2024-06-17 DIAGNOSIS — I48.92 ATRIAL FLUTTER, UNSPECIFIED TYPE (MULTI): ICD-10-CM

## 2024-06-17 DIAGNOSIS — F32.A DEPRESSION, UNSPECIFIED DEPRESSION TYPE: ICD-10-CM

## 2024-06-17 DIAGNOSIS — M67.40 GANGLION CYST: ICD-10-CM

## 2024-06-17 PROCEDURE — 3074F SYST BP LT 130 MM HG: CPT | Performed by: INTERNAL MEDICINE

## 2024-06-17 PROCEDURE — 3078F DIAST BP <80 MM HG: CPT | Performed by: INTERNAL MEDICINE

## 2024-06-17 PROCEDURE — 1036F TOBACCO NON-USER: CPT | Performed by: INTERNAL MEDICINE

## 2024-06-17 PROCEDURE — 99214 OFFICE O/P EST MOD 30 MIN: CPT | Performed by: INTERNAL MEDICINE

## 2024-06-17 RX ORDER — LOSARTAN POTASSIUM AND HYDROCHLOROTHIAZIDE 25; 100 MG/1; MG/1
1 TABLET ORAL
COMMUNITY
Start: 2024-05-19 | End: 2024-06-17 | Stop reason: WASHOUT

## 2024-06-17 RX ORDER — NYSTATIN AND TRIAMCINOLONE ACETONIDE 100000; 1 [USP'U]/G; MG/G
OINTMENT TOPICAL 2 TIMES DAILY
Qty: 30 G | Refills: 3 | Status: SHIPPED | OUTPATIENT
Start: 2024-06-17 | End: 2025-06-17

## 2024-06-17 ASSESSMENT — ENCOUNTER SYMPTOMS
DEPRESSION: 0
OCCASIONAL FEELINGS OF UNSTEADINESS: 0
LOSS OF SENSATION IN FEET: 0

## 2024-06-17 NOTE — PROGRESS NOTES
Subjective   Patient ID: Kenn Oneal is a 64 y.o. male who presents for No chief complaint on file..    HPI patient is here for follow-up hypertension and also blood work he had a normal PSA and hep C screening    Review of Systems  Patient sees cardiology for AI on Eliquis for A-fib chads 1  No chest pain or shortness of breath heart palpitations  GI up-to-date with colonoscopy   stable has rash on his penis would like a refill that urology gave him some cream does not know the name but will get back to me  Musculoskeletal right toe he had what appears to be a ganglion cyst removed by podiatry will not heal completely  Objective   /67 (BP Location: Left arm, Patient Position: Sitting)   Pulse 60   Wt 67.6 kg (149 lb)   SpO2 98%   BMI 21.38 kg/m²     Physical Exam  Constitutional:       Appearance: He is normal weight.   Cardiovascular:      Rate and Rhythm: Regular rhythm.      Pulses: Normal pulses.      Heart sounds: Murmur heard.   Pulmonary:      Breath sounds: Normal breath sounds.   Abdominal:      General: Abdomen is flat. Bowel sounds are normal.      Tenderness: There is no abdominal tenderness.   Musculoskeletal:      Comments: Right great toe small red area site where his ganglion on was drained but not completely removed suggest he follow-up with podiatry   Neurological:      Mental Status: He is alert.         Assessment/Plan   Diagnoses and all orders for this visit:  Balanitis  Either get a refill on the medication or call me with the name of it so I can refill it  Depression, unspecified depression type  Good control  Primary hypertension  Good control  Heart murmur  This is aortic insufficiency follow-up with cardiology    Atrial flutter, unspecified type (Multi)  Continue with your Eliquis follow-up with cardiology  Ganglion cyst  Check with podiatry you may need to have this further excised  Follow-up with me in March for your physical

## 2024-08-12 ENCOUNTER — TELEPHONE (OUTPATIENT)
Dept: SCHEDULING | Age: 64
End: 2024-08-12
Payer: COMMERCIAL

## 2024-08-12 DIAGNOSIS — Z79.01 CHRONIC ANTICOAGULATION: ICD-10-CM

## 2024-09-09 DIAGNOSIS — I10 HYPERTENSION, UNSPECIFIED TYPE: ICD-10-CM

## 2024-09-10 RX ORDER — AMLODIPINE BESYLATE 2.5 MG/1
2.5 TABLET ORAL DAILY
Qty: 90 TABLET | Refills: 3 | Status: SHIPPED | OUTPATIENT
Start: 2024-09-10 | End: 2025-09-10

## 2024-10-24 ENCOUNTER — APPOINTMENT (OUTPATIENT)
Dept: CARDIOLOGY | Facility: HOSPITAL | Age: 64
End: 2024-10-24
Payer: COMMERCIAL

## 2024-10-24 ENCOUNTER — HOSPITAL ENCOUNTER (OUTPATIENT)
Dept: CARDIOLOGY | Facility: HOSPITAL | Age: 64
Discharge: HOME | End: 2024-10-24
Payer: COMMERCIAL

## 2024-10-24 VITALS
HEART RATE: 52 BPM | OXYGEN SATURATION: 99 % | SYSTOLIC BLOOD PRESSURE: 136 MMHG | WEIGHT: 157.6 LBS | DIASTOLIC BLOOD PRESSURE: 74 MMHG | BODY MASS INDEX: 22.56 KG/M2 | HEIGHT: 70 IN

## 2024-10-24 VITALS — WEIGHT: 149 LBS | HEIGHT: 70 IN | BODY MASS INDEX: 21.33 KG/M2

## 2024-10-24 DIAGNOSIS — I10 HYPERTENSION, UNSPECIFIED TYPE: ICD-10-CM

## 2024-10-24 DIAGNOSIS — I48.0 PAF (PAROXYSMAL ATRIAL FIBRILLATION) (MULTI): ICD-10-CM

## 2024-10-24 DIAGNOSIS — I48.0 PAROXYSMAL ATRIAL FIBRILLATION (MULTI): ICD-10-CM

## 2024-10-24 DIAGNOSIS — I35.1 NONRHEUMATIC AORTIC VALVE INSUFFICIENCY: ICD-10-CM

## 2024-10-24 DIAGNOSIS — E78.1 HYPERTRIGLYCERIDEMIA: ICD-10-CM

## 2024-10-24 DIAGNOSIS — I51.7 LVH (LEFT VENTRICULAR HYPERTROPHY): ICD-10-CM

## 2024-10-24 DIAGNOSIS — I10 PRIMARY HYPERTENSION: Primary | ICD-10-CM

## 2024-10-24 DIAGNOSIS — Z79.01 CHRONIC ANTICOAGULATION: ICD-10-CM

## 2024-10-24 LAB
AORTIC VALVE MEAN GRADIENT: 9.2 MMHG
AORTIC VALVE PEAK VELOCITY: 2.02 M/S
AV PEAK GRADIENT: 16.3 MMHG
AVA (PEAK VEL): 3.3 CM2
AVA (VTI): 3.13 CM2
EJECTION FRACTION APICAL 4 CHAMBER: 58.4
EJECTION FRACTION: 58 %
LEFT ATRIUM VOLUME AREA LENGTH INDEX BSA: 34.7 ML/M2
LEFT VENTRICLE INTERNAL DIMENSION DIASTOLE: 4.16 CM (ref 3.5–6)
LEFT VENTRICULAR OUTFLOW TRACT DIAMETER: 2.15 CM
MITRAL VALVE E/A RATIO: 0.44
RIGHT VENTRICLE FREE WALL PEAK S': 11 CM/S
RIGHT VENTRICLE PEAK SYSTOLIC PRESSURE: 24 MMHG
TRICUSPID ANNULAR PLANE SYSTOLIC EXCURSION: 2 CM

## 2024-10-24 PROCEDURE — 93010 ELECTROCARDIOGRAM REPORT: CPT | Performed by: INTERNAL MEDICINE

## 2024-10-24 PROCEDURE — 99214 OFFICE O/P EST MOD 30 MIN: CPT | Performed by: INTERNAL MEDICINE

## 2024-10-24 PROCEDURE — 93005 ELECTROCARDIOGRAM TRACING: CPT | Performed by: INTERNAL MEDICINE

## 2024-10-24 PROCEDURE — 3078F DIAST BP <80 MM HG: CPT | Performed by: INTERNAL MEDICINE

## 2024-10-24 PROCEDURE — 3008F BODY MASS INDEX DOCD: CPT | Performed by: INTERNAL MEDICINE

## 2024-10-24 PROCEDURE — 93306 TTE W/DOPPLER COMPLETE: CPT

## 2024-10-24 PROCEDURE — 93306 TTE W/DOPPLER COMPLETE: CPT | Performed by: INTERNAL MEDICINE

## 2024-10-24 PROCEDURE — 3075F SYST BP GE 130 - 139MM HG: CPT | Performed by: INTERNAL MEDICINE

## 2024-10-24 PROCEDURE — G2211 COMPLEX E/M VISIT ADD ON: HCPCS | Performed by: INTERNAL MEDICINE

## 2024-10-24 RX ORDER — ASCORBIC ACID 500 MG
500 TABLET ORAL DAILY
COMMUNITY
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
## 2024-10-24 NOTE — PATIENT INSTRUCTIONS
1. Hypertension- please check your blood pressure and heart rate daily and log results for review. To continue  losartan 100 mg daily and HCTZ 25 mg daily BP appears adequatley controlled at home.   2. Aortic insufficiency ( leaking aortic valve) - moderate by echo previously, mild to moderate by MRI. Follow up echo in September 2023 difficult to grade AI though LV size remains normal. Totally asymptomatic at this point. Follow up echo done today- will review  3. Palpitations- pt found to have PAF with [id number] score of 1. Chronic anticoagulation- continue eliquis 5 mg bid. Most recent 2 week monitor( 10/2023)  without any AFIb, and no palpitations. To continue current regimen for now. If further palpitations can consider ablation in the future. Brief episodes of SOB on occasion- will check BNP ( blood work). If elevated, will recheck 2 week monitor to reassess Afib burden.  4. Pt with snoring and insomnia with AFIb-  in lab sleep study scheduled to be done soon to assess for possible AGUSTINA. Untreated sleep apnea may also make it difficult to control your blood pressure.  5. LDL well controlled on plant based diet. Previously triglycerides were elevated- will recheck fasting lipid panel.   6. Possible AGUSTINA- working with dentist re dental appliance  7. Continue current level of exercise.   Return to clinic in 3 months

## 2024-10-25 LAB
ATRIAL RATE: 52 BPM
P AXIS: 25 DEGREES
P OFFSET: 172 MS
P ONSET: 120 MS
PR INTERVAL: 206 MS
Q ONSET: 223 MS
QRS COUNT: 9 BEATS
QRS DURATION: 110 MS
QT INTERVAL: 464 MS
QTC CALCULATION(BAZETT): 431 MS
QTC FREDERICIA: 442 MS
R AXIS: -33 DEGREES
T AXIS: 27 DEGREES
T OFFSET: 455 MS
VENTRICULAR RATE: 52 BPM

## 2024-11-13 ENCOUNTER — HOSPITAL ENCOUNTER (OUTPATIENT)
Dept: CARDIOLOGY | Facility: HOSPITAL | Age: 64
Discharge: HOME | End: 2024-11-13
Payer: COMMERCIAL

## 2024-11-13 DIAGNOSIS — I51.7 LVH (LEFT VENTRICULAR HYPERTROPHY): ICD-10-CM

## 2024-11-13 PROCEDURE — 93321 DOPPLER ECHO F-UP/LMTD STD: CPT | Performed by: INTERNAL MEDICINE

## 2024-11-13 PROCEDURE — 93325 DOPPLER ECHO COLOR FLOW MAPG: CPT

## 2024-11-13 PROCEDURE — 93016 CV STRESS TEST SUPVJ ONLY: CPT | Performed by: INTERNAL MEDICINE

## 2024-11-13 PROCEDURE — 93325 DOPPLER ECHO COLOR FLOW MAPG: CPT | Performed by: INTERNAL MEDICINE

## 2024-11-13 PROCEDURE — 93350 STRESS TTE ONLY: CPT | Performed by: INTERNAL MEDICINE

## 2024-11-13 PROCEDURE — 93018 CV STRESS TEST I&R ONLY: CPT | Performed by: INTERNAL MEDICINE

## 2024-12-13 DIAGNOSIS — Q24.8 LEFT VENTRICULAR OUTFLOW TRACT OBSTRUCTION (HHS-HCC): ICD-10-CM

## 2024-12-13 DIAGNOSIS — I51.7 LEFT VENTRICULAR HYPERTROPHY: Primary | ICD-10-CM

## 2024-12-13 NOTE — PROGRESS NOTES
Patient with septal hypertrophy and recent exercise stress echo with SANTA and LVOT obstruction. Will reassess MRI to assess for HCM and scar burden. Of note had cardiac stress MRI about 2 years ago without ischemia but did have septum up to 1.6 cm thick. To be done at [facility]     Pt has mesh abdominal hernia repair though at prior MRI appointment they said would not present any issues for his MRI

## 2025-03-10 ENCOUNTER — APPOINTMENT (OUTPATIENT)
Dept: PRIMARY CARE | Facility: CLINIC | Age: 65
End: 2025-03-10
Payer: COMMERCIAL

## 2025-03-17 ENCOUNTER — HOSPITAL ENCOUNTER (OUTPATIENT)
Dept: RADIOLOGY | Facility: HOSPITAL | Age: 65
Discharge: HOME | End: 2025-03-17
Payer: COMMERCIAL

## 2025-03-17 VITALS — HEIGHT: 70 IN | WEIGHT: 157.63 LBS | BODY MASS INDEX: 22.57 KG/M2

## 2025-03-17 DIAGNOSIS — Q24.8 LEFT VENTRICULAR OUTFLOW TRACT OBSTRUCTION (HHS-HCC): ICD-10-CM

## 2025-03-17 DIAGNOSIS — I51.7 LEFT VENTRICULAR HYPERTROPHY: ICD-10-CM

## 2025-03-17 PROCEDURE — A9575 INJ GADOTERATE MEGLUMI 0.1ML: HCPCS | Performed by: INTERNAL MEDICINE

## 2025-03-17 PROCEDURE — 2550000001 HC RX 255 CONTRASTS: Performed by: INTERNAL MEDICINE

## 2025-03-17 PROCEDURE — 75561 CARDIAC MRI FOR MORPH W/DYE: CPT

## 2025-03-17 RX ORDER — GADOTERATE MEGLUMINE 376.9 MG/ML
28 INJECTION INTRAVENOUS
Status: COMPLETED | OUTPATIENT
Start: 2025-03-17 | End: 2025-03-17

## 2025-03-17 RX ADMIN — GADOTERATE MEGLUMINE 28 ML: 376.9 INJECTION INTRAVENOUS at 16:01

## 2025-04-10 DIAGNOSIS — F32.A DEPRESSION, UNSPECIFIED DEPRESSION TYPE: ICD-10-CM

## 2025-04-10 RX ORDER — ESCITALOPRAM OXALATE 10 MG/1
TABLET ORAL
Qty: 45 TABLET | Refills: 3 | OUTPATIENT
Start: 2025-04-10

## 2025-04-24 ENCOUNTER — APPOINTMENT (OUTPATIENT)
Dept: CARDIOLOGY | Facility: HOSPITAL | Age: 65
End: 2025-04-24
Payer: COMMERCIAL

## 2025-04-29 LAB
ANION GAP SERPL CALCULATED.4IONS-SCNC: 10 MMOL/L (CALC) (ref 7–17)
BNP SERPL-MCNC: 90 PG/ML
BUN SERPL-MCNC: 11 MG/DL (ref 7–25)
BUN/CREAT SERPL: ABNORMAL (CALC) (ref 6–22)
CALCIUM SERPL-MCNC: 9.4 MG/DL (ref 8.6–10.3)
CHLORIDE SERPL-SCNC: 102 MMOL/L (ref 98–110)
CHOLEST SERPL-MCNC: 130 MG/DL
CHOLEST/HDLC SERPL: 4.3 (CALC)
CO2 SERPL-SCNC: 28 MMOL/L (ref 20–32)
CREAT SERPL-MCNC: 0.98 MG/DL (ref 0.7–1.35)
EGFRCR SERPLBLD CKD-EPI 2021: 86 ML/MIN/1.73M2
GLUCOSE SERPL-MCNC: 103 MG/DL (ref 65–99)
HDLC SERPL-MCNC: 30 MG/DL
LDLC SERPL CALC-MCNC: 72 MG/DL (CALC)
NONHDLC SERPL-MCNC: 100 MG/DL (CALC)
POTASSIUM SERPL-SCNC: 4.3 MMOL/L (ref 3.5–5.3)
SODIUM SERPL-SCNC: 140 MMOL/L (ref 135–146)
TRIGL SERPL-MCNC: 185 MG/DL

## 2025-05-01 ENCOUNTER — OFFICE VISIT (OUTPATIENT)
Dept: CARDIOLOGY | Facility: HOSPITAL | Age: 65
End: 2025-05-01
Payer: COMMERCIAL

## 2025-05-01 VITALS
HEIGHT: 70 IN | SYSTOLIC BLOOD PRESSURE: 128 MMHG | DIASTOLIC BLOOD PRESSURE: 77 MMHG | OXYGEN SATURATION: 99 % | BODY MASS INDEX: 22.62 KG/M2 | HEART RATE: 57 BPM | WEIGHT: 158 LBS

## 2025-05-01 DIAGNOSIS — R06.83 SNORING: ICD-10-CM

## 2025-05-01 DIAGNOSIS — I35.1 NONRHEUMATIC AORTIC VALVE INSUFFICIENCY: Primary | ICD-10-CM

## 2025-05-01 DIAGNOSIS — R73.03 PREDIABETES: ICD-10-CM

## 2025-05-01 DIAGNOSIS — R01.1 HEART MURMUR: ICD-10-CM

## 2025-05-01 DIAGNOSIS — I51.7 LEFT VENTRICULAR HYPERTROPHY: ICD-10-CM

## 2025-05-01 DIAGNOSIS — R73.01 ELEVATED FASTING GLUCOSE: ICD-10-CM

## 2025-05-01 DIAGNOSIS — E78.1 HYPERTRIGLYCERIDEMIA: ICD-10-CM

## 2025-05-01 DIAGNOSIS — I10 HYPERTENSION, UNSPECIFIED TYPE: ICD-10-CM

## 2025-05-01 LAB
ATRIAL RATE: 57 BPM
P AXIS: 58 DEGREES
P OFFSET: 169 MS
P ONSET: 111 MS
PR INTERVAL: 200 MS
Q ONSET: 211 MS
QRS COUNT: 9 BEATS
QRS DURATION: 108 MS
QT INTERVAL: 454 MS
QTC CALCULATION(BAZETT): 441 MS
QTC FREDERICIA: 446 MS
R AXIS: -24 DEGREES
T AXIS: 45 DEGREES
T OFFSET: 438 MS
VENTRICULAR RATE: 57 BPM

## 2025-05-01 PROCEDURE — 99212 OFFICE O/P EST SF 10 MIN: CPT | Performed by: INTERNAL MEDICINE

## 2025-05-01 PROCEDURE — 3074F SYST BP LT 130 MM HG: CPT | Performed by: INTERNAL MEDICINE

## 2025-05-01 PROCEDURE — 3078F DIAST BP <80 MM HG: CPT | Performed by: INTERNAL MEDICINE

## 2025-05-01 PROCEDURE — 3008F BODY MASS INDEX DOCD: CPT | Performed by: INTERNAL MEDICINE

## 2025-05-01 PROCEDURE — 93010 ELECTROCARDIOGRAM REPORT: CPT | Performed by: INTERNAL MEDICINE

## 2025-05-01 PROCEDURE — 93005 ELECTROCARDIOGRAM TRACING: CPT | Performed by: INTERNAL MEDICINE

## 2025-05-01 PROCEDURE — G2211 COMPLEX E/M VISIT ADD ON: HCPCS | Performed by: INTERNAL MEDICINE

## 2025-05-01 PROCEDURE — 99214 OFFICE O/P EST MOD 30 MIN: CPT | Performed by: INTERNAL MEDICINE

## 2025-05-01 NOTE — PROGRESS NOTES
Primary Care Physician: Michael Boothe MD      Date of Visit: 05/01/2025  8:40 AM EDT  Location of visit: [facility]   Type of Visit: Established Patient     HPI / Summary:   Prasad Oneal is a very pleasant 65 y.o. male  with HTN and LVH by EKG with AI and with PAF now chronically anticoagulated who returns for follow up     CAD risk factors: + HTN, no DM,m no FHx CAD, never smoked, no dyslipidemia ( vegetarian)  Pt did complain of palpitations about 2 x per week in  the past . These had a sudden onset without precipitant and may last up to 30 seconds. + chest tightness with palpitations but no dizziness presyncope or syncope with palpitations and no SOB. These palpitations were described as fast but regular. These usually occurred at rest ( non-exertional). He typically exercises 3 x per week doing yoga. Has no CP or SOB with exertion. Has one cup of tea in the AM. Denies PND orthopnea or LE edema. With new murmur no history of fevers or night sweats.     [facility] assessment:  CT of chest 11/29/2022: no coronary artery calcium identified. normal thoracic aortic size mild emphysematous changes bilaterally and some pulmonary nodules seen. ( multiple 3-6 mm)   Echo for new murmur Nov 9 2022: normal LV size and systolic function LVEF 60-65% indeterminant diastology, mild LAE, mobile atrial septum, AV trileaflet with moderate AI ( 2+ or possibly 2-3+) difficult to grade , mild MR and nonobstructive SANTA and mildly reduced RV systolic function.    2 week monitor 12/2022: PVCs, 0.04% PSVCs, atrial flutter burden 3.57% longest 3 hours, fastest 116 bpm.  Cardiac stress MRI- Jan 5 2023: LVEF 53%, no scar or ischemia mild sigmoid hypertrophy mild LAE Mild AI ( regurgitant fraction 17% )  2 week monitor: 9/21/2023-10/006168: one episode of nonsustained  bpm < 1% Ves and < 1% SVEs 0% Afib.      Pt was started on eliquis 5 mg bid for [id number] score of 1. He has had no bleeding issues on the eliquis, Patient  notes having no palpitations  and denies any presyncope or syncope and denies PEREZ or CP on exertion. PT recently saw sleep medicine for ? Of AGUSTINA given daytime sleepiness, snoring and gasping etc. Home study did not really show significant AGUSTINA, though thought to be false negative and considered  repeating an in lab sleep study, however his dentist is making him a splint to treat anyway.     More recent imaging to follow up AI:  Echocardiogram 10/2024: septal bulge up to 1.5 cm thick with moderate AI , mildly dilated LV with preserved LV systolic function. Nonobstructive SANTA at rest. Vaslalva not done. There was color acceleration within the LVOT, unclear if peak gradients could have been missed. Moderate AI ( though again difficult to grade)  Stress echo 11/13/2024: LVEF 60-65% increased to 70-75%. There was an increase in SANTA with stress though no obvious increase in MR. The LVOT gradient increased to 64mmHg . Fx capacity was above average for age and gender.  Cardiac MRI 3/2025: mildly dilated LV with LVEF 60%. Asymmetric septal hypertrophy with septum measuring 1.3 cm. Equivocal chordal SANTA and mild LVOT acceleration. No scar no thrombus and moderate to severe AI.    Pts BP at home runs 120s-130/70smmHg. He continues to do yoga 6 x per week without sx. HR gets up to 120 bpm with yoga( baseline HR high 50s to 60sbpm on no beta blocker.  Now using dental appliance with less snoring, still does not sleep well. Denies dizziness presyncope or syncope and denies palpitations. Remains asymptomatic without CP or SOB.        ROS: Full 10 pt review of symptoms of negative unless discussed above.     Problems:   Problem List[1]  Medical History:   Medical History[2]  Surgical Hx:   Surgical History[3]   Social Hx:   Tobacco Use: Low Risk  (5/1/2025)    Patient History     Smoking Tobacco Use: Never     Smokeless Tobacco Use: Never     Passive Exposure: Not on file     Alcohol Use: Not on file     Family Hx:   Family  "History[4]   Exam:   Vitals:   Vitals:    05/01/25 0843   BP: 128/77   BP Location: Right arm   Patient Position: Sitting   BP Cuff Size: Adult   Pulse: 57   SpO2: 99%   Weight: 71.7 kg (158 lb)   Height: 1.778 m (5' 10\")     Wt Readings from Last 5 Encounters:   05/01/25 71.7 kg (158 lb)   03/17/25 71.5 kg (157 lb 10.1 oz)   03/17/25 71.5 kg (157 lb 10.1 oz)   10/24/24 67.6 kg (149 lb)   10/24/24 71.5 kg (157 lb 9.6 oz)      Constitutional:       Appearance: Healthy appearance. Not in distress.   Pulmonary:      Effort: Pulmonary effort is normal.      Breath sounds: Normal breath sounds.   Cardiovascular:      PMI at left midclavicular line. Normal rate. Regular rhythm. S1 with normal intensity. S2 with normal intensity.       Murmurs: There is a grade 1/6 systolic murmur at the URSB. There is a grade 1/4 high frequency blowing decrescendo, early diastolic murmur at the URSB, radiating to the apex.   Pulses:     Intact distal pulses.   Edema:     Peripheral edema absent.   Neurological:      Mental Status: Alert and oriented to person, place, and time.       Labs:   Recent Labs     10/28/22  0852 11/23/21  1051 11/05/20  1018 08/31/20  0934   WBC 6.6 7.8 6.5 7.9   HGB 13.8 14.4 14.0 14.2   HCT 41.8 43.3 39.9* 43.2    213 169 190   MCV 96 93 89 96     Recent Labs     04/28/25  1042 01/12/24  1007 06/26/23  0901 02/15/23  1034 10/28/22  0852 11/23/21  1051 11/05/20  1018 08/31/20  0934    142 140 144 142 141 139 144   K 4.3 4.0 3.9 4.3 3.8 4.0 4.2 4.6    104 104 105 106 104 103 107   BUN 11 12 9 9 9 11 10 10   CREATININE 0.98 0.79 0.91 0.86 0.85 0.78 0.94 0.82      Recent Labs     04/28/25  1042 08/06/22  0824   HGBA1C  --  4.9   BNP 90  --      Lab Results   Component Value Date    CHOL 130 04/28/2025    HDL 30 (L) 04/28/2025    LDLF 45 10/28/2022    TRIG 185 (H) 04/28/2025     Lab Results   Component Value Date    LDLCALC 72 04/28/2025      Notable Studies: imaging personally reviewed and " summarized by me below  EKG:  Encounter Date: 05/01/25   ECG 12 lead (Clinic Performed)   Result Value    Ventricular Rate 57    Atrial Rate 57    ME Interval 200    QRS Duration 108    QT Interval 454    QTC Calculation(Bazett) 441    P Axis 58    R Axis -24    T Axis 45    QRS Count 9    Q Onset 211    P Onset 111    P Offset 169    T Offset 438    QTC Fredericia 446    Narrative    Sinus bradycardia  Voltage criteria for left ventricular hypertrophy  Abnormal ECG  When compared with ECG of 24-OCT-2024 09:47,  No significant change was found  Confirmed by Stu Tomlinson ([id number]) on 5/1/2025 9:37:25 PM       Echo:  - Echo (9/21/2023)  PHYSICIAN INTERPRETATION:  Left Ventricle: The left ventricular systolic function is normal, with an estimated ejection fraction of 55-60%. There are no regional wall motion abnormalities. The left ventricular cavity size is normal. Left ventricular diastolic filling was indeterminate.  Left Atrium: The left atrium is mildly dilated.  Right Ventricle: The right ventricle is normal in size. There is low normal right ventricular systolic function.  Right Atrium: The right atrium is normal in size.  Aortic Valve: The aortic valve was not well visualized. There is indeterminate aortic valve regurgitation. The peak instantaneous gradient of the aortic valve is 12.8 mmHg. The mean gradient of the aortic valve is 7.4 mmHg. AV is porly seen and the degree of AI is vry difficult to quantify, though looks singificant. In addition there appears to be SANTA of the MV leaflets with color acceleration within the LVOT though no significant LVOT gradient was measured.  Mitral Valve: The mitral valve is normal in structure. There is trace to mild mitral valve regurgitation. There is chordal and possibly leaflet tip SANTA. There is color acceleration within tht eLVOT.  Tricuspid Valve: The tricuspid valve is structurally normal. There is trace tricuspid regurgitation. The right ventricular systolic pressure  is unable to be estimated.  Pulmonic Valve: The pulmonic valve is not well visualized. There is physiologic pulmonic valve regurgitation.  Pericardium: There is no pericardial effusion noted.  Aorta: The aortic root is abnormal. There is mild dilatation of the ascending aorta. There is mild dilatation of the aortic root.  In comparison to the previous echocardiogram(s): Compared with study from 11/9/2022,. Compared with the prior exam from 11/9/2022 the images today were technically more difficult and the AV was not as well seen. The degreee of AI may have increased though the LV cavity appears to still be normal in size. Consider alternate imaging modailty( SHI vs MRI) to further assess the deree of AI. Also given that the patinet has exertional sx with SANTA at rest, unclear if a stress echo to assess LVOT gradients at rest and post stress would be helpful to deterine cause of PEREZ.        CONCLUSIONS:  1. Left ventricular systolic function is normal with a 55-60% estimated ejection fraction.  2. There is low normal right ventricular systolic function.  3. AV is porly seen and the degree of AI is vry difficult to quantify, though looks singificant. In addition there appears to be SANTA of the MV leaflets with color acceleration within the LVOT though no significant LVOT gradient was measured.  4. Note the patient is in sinus bradycadia at 50 bpm. ( earlier in day was in rapid atrial fibrillation.  5. Compared with the prior exam from 11/9/2022 the images today were technically more difficult and the AV was not as well seen. The degreee of AI may have increased though the LV cavity appears to still be normal in size. Consider alternate imaging modailty( SHI vs MRI) to further assess the deree of AI. Also given that the patinet has exertional sx with SANTA at rest, unclear if a stress echo to assess LVOT gradients at rest and post stress would be helpful to deterine cause of PEREZ.     Cardiac stress MRI 1/5/2023:  No  evidence of ischemia on Regadenoson induced stress.  No evidence of scar/fibrosis.      LEFT VENTRICLE: Quantitative LVEF 53 %.     VIABILITY: Hyperenhancement is normal.     STRESS: Stress perfusion is normal.     RIGHT VENTRICLE: There is mild RVH. RV cavity size is normal. RV   systolic function is normal.     LV/RV SEPTUM: Mild sigmoidal hypertrophy with maximal thickness of   1.6 cm.     LA/RA SEPTUM: The atrial septum is intact.     LEFT ATRIUM: LA is mildly enlarged.     RIGHT ATRIUM: There is no RA mass/thrombus. RA cavity size is normal.     PERICARDIUM: Pericardium is normal. There is no pericardial effusion.     AORTIC VALVE: Peak aortic valve velocity 200 cm/sec. Aortic   regurgitant volume 21 ml. Aortic regurgitant fraction 17 %.  Peak aortic valve gradient 16 mmHg.     MITRAL VALVE: Mitral valve leaflets are normal. The mitral valve   annulus is normal in size. There is mild mitral  regurgitation. Mitral valve is mildly thickened.     TRICUSPID VALVE: Tricuspid valve leaflets are normal. The tricuspid   valve annulus is normal in size.     AORTIC ROOT: The aortic root is mildly dilated.        Echo 10/24/2024:  PHYSICIAN INTERPRETATION:  Left Ventricle: Left ventricular ejection fraction is normal, calculated by Ren's biplane at 58%. There are no regional left ventricular wall motion abnormalities. The left ventricular cavity size is normal. Spectral Doppler shows an impaired relaxation pattern of left ventricular diastolic filling. Proximal septal bulge up to 1.5 cm thick, with nonobstructive SANTA at rest. Valsalva was not performed.  Left Atrium: The left atrium is mildly dilated.  Right Ventricle: The right ventricle is normal in size. There is normal right ventricular global systolic function.  Right Atrium: The right atrium is normal in size.  Aortic Valve: The aortic valve is trileaflet. The aortic valve dimensionless index is 0.86. There is moderate aortic valve regurgitation. The peak  instantaneous gradient of the aortic valve is 16.3 mmHg. The mean gradient of the aortic valve is 9.2 mmHg. Trileaflet AV with AI , possibly moderate though difficult to assess which originates centrally and from the commissure between the RCC and LCC, with no AS.  Mitral Valve: The mitral valve is normal in structure. There is mild mitral valve regurgitation which is centrally directed. Structurally normal leaflets with chordal and leaflet tip SANTA and only mild centrally directed MR. There is color acceleration within the LVOT around the basal septum though no high gradients were measured.  Tricuspid Valve: The tricuspid valve is structurally normal. There is trace tricuspid regurgitation. The Doppler estimated RVSP is within normal limits at 24.0 mmHg.  Pulmonic Valve: The pulmonic valve is structurally normal. There is physiologic pulmonic valve regurgitation.  Pericardium: There is no pericardial effusion noted.  Aorta: The aortic root is normal. The aortic root is at the upper limits of normal size.  Systemic Veins: The inferior vena cava appears normal in size, with IVC inspiratory collapse greater than 50%.  In comparison to the previous echocardiogram(s): Compared with study dated 9/21/2023,. Compared withthe prior exam frmo 9/21/2023, the degree of AI appears similar though still difficult to grade severity , probably moderate. The LV cavioty appear similar in size. There is still a proximal sweptal bulge which appears a little more prominent vs just views obtained today. There was already SANTA with color acceleration within the LVOT but no significant LVOT gradients were measured in either study, unclear if could hae been missed.        CONCLUSIONS:   1. Left ventricular ejection fraction is normal, calculated by Ren's biplane at 58%.   2. Spectral Doppler shows an impaired relaxation pattern of left ventricular diastolic filling.   3. Proximal septal bulge up to 1.5 cm thick, with nonobstructive SANTA at  rest. Valsalva was not performed.   4. There is normal right ventricular global systolic function.   5. The left atrium is mildly dilated.   6. Right ventricular systolic pressure is within normal limits.   7. Trileaflet AV with AI , possibly moderate though difficult to assess which originates centrally and from the commissure between the RCC and LCC, with no AS.   8. Moderate aortic valve regurgitation.   9. Compared withthe prior exam frmo 9/21/2023, the degree of AI appears similar though still difficult to grade severity , probably moderate. The LV cavioty appear similar in size. There is still a proximal sweptal bulge which appears a little more prominent vs just views obtained today. There was already SANTA with color acceleration within the LVOT but no significant LVOT gradients were measured in either study, unclear if could hae been missed.    3/28/2025- cardiac MRI  FINDINGS:  CARDIAC CHAMBERS:  Normal atrioventricular and ventriculoarterial concordance      LEFT ATRIUM:  Normal size (DANNY - 32.54 ml/m2).      RIGHT ATRIUM:  Normal (RA area max 4ch - 20.20 cm2)      INTERATRIAL SEPTUM:  Intact.      LEFT VENTRICLE:  1. Mildly dilated LV (EDVi 180 ml/m2) with normal systolic function  (LVEF 60%).  2. No regional wall motion abnormalities.  3. Asymmetric basal septal hypertrophy to 1.3 cm at its maximal  thickness and normal LV indexed mass (LVMi 62 g/m2). Equivocal  chordal SANTA. Mild LVOT flow acceleration, correlate with TTE.  4. Normal native T2 values (<55ms).  5. Normal ECV 24%.  6. No evidence of LV thrombus.  7. Following administration of gadolinium, in the late phase, there  is no significant myocardial enhancement.      Quantitative left ventricular functional values are as follows:  EDV = 221 cc; EDVi = 118 cc/m2  ESV = 86 cc; ESVi = 46 cc/m2  LV mass = 123 gm; LVMi = 66 gm/m2  Stroke volume = 134 cc; SVi = 72 cc/m2  LVEF = 61 %      Myocardial T2 mapping-48 ms.      LV septal wall thickness  (anterobasal): 1.1 cm  LV postero-inferior wall thickness: 0.7 cm  LVEDD: 5.8 cm  LVESD: 3.4 cm      RIGHT VENTRICLE:  1. Normal RV size (EDVi 92 ml/m2) and systolic function (RVEF 62%).  2. No regional wall motion abnormalities.  3. No abnormal delayed enhancement in the myocardium.      Quantitative right ventricular functional values are as follows:  RVEDV = 113.66 ml; RVEDVi = 60.83 ml/m2  RVESV = 60.97 ml; RVESVi = 32.64 ml/m2  RVSV = 52.68 ml; RVSVi = 28.20 ml/m2  RVEF = 46.00 %  RVCO = 2.70 l/min; RVCI = 1.45 l/min/m2      INTERVENTRICULAR SEPTUM:  Intact.      AORTIC VALVE:  The aortic valve is trileaflet.  There is quantitatively moderate-severe aortic regurgitation. The AI  jet is central in nature and appears to stem from malcoaptation of  the right and left coronary cusps. IVAN 4.4 cm2. Flow quantification  through the ascending aorta: Forward volume = 118.48 cc/beat  Reverse volume = -44.73 cc/beat  Net forward volume = 73.75 cc/beat  Aortic regurgitant fraction = 38 %      MITRAL VALVE:  The mitral valve leaflets appear normal.  There is mild mitral regurgitation by qualitative assessment.      TRICUSPID VALVE:  There is qualitatively trivial tricuspid regurgitation.      PULMONARY VALVE:  Not assessed.      PERICARDIUM:  The pericardium is normal.  There is no pericardial effusion.      THORACIC AORTA:  The thoracic aorta appears normal in course and contour. The aortic  root is normal in size. There is no evidence for acute aortic  pathology. The ascending thoracic aorta is 3.6 cm in diameter. The  arch vessel branching pattern is  normal.   All the arch branch  vessels appear widely patent in their proximal portions.      AORTIC ROOT DIMENSIONS:  Annulus: 2.4 cm  Aortic root(sinus of valsalva): 3.2 cm  Sinotubular junction: 2.5 cm      PULMONARY ARTERIES:  The central pulmonary arteries appear normal (MPA-2.5 cm, RPA-2.3 cm,  LPA-1.8 cm).      SYSTEMIC AND PULMONARY VEINS:  Normal systemic venous and  pulmonary venous return.  The SVC is of normal caliber. IVC appears normal  Normal pulmonary venous anatomy.      CHEST:  The chest wall is normal.  Limited imaging through the lungs reveals no gross abnormalities. No  pleural effusion.      UPPER ABDOMEN:  Limited imaging through the upper abdomen reveals no abnormalities of  the visualized organs.      IMPRESSION:  1. Mildly dilated LV (EDVi 180 ml/m2) with normal systolic function  (LVEF 60%).  2. Mild basal septal hypertrophy (1.3 cm) with LVOT flow  acceleration. Equivocal chordal SANTA.  3. Tricuspid aortic valve with moderate-severe central aortic  regurgitation (RF 38%). Consider further assessment with SHI.  4. No evidence of myocardial fibrosis, infiltration or infarction  based on LGE imaging.  5. Normal RV size (EDVi 61 ml/m2) with preserved systolic function  (RVEF 46%).      Stress echo 11/13/2024:  Summary:   1. The resting ejection fraction was estimated at 60 to 65% with a peak exercise ejection fraction estimated at 70 to 75%.   2. Normal global left ventricular systolic function.   3. At rest there is mild AV leaflet thickening with mild to moderate AI. There is also SANTA of the subvalvular apparatus but no true leaflet SANTA . Non-obstructive with no significant LVOT gradient at rest or with valsalva.   4. After exercise the SANTA increased and no significant change in the mild MR post stress. Note that the LVOT gradient appears to have increased to 64mmHg, though many of the Doppler envelopes were contaminated with MR signal so difficult to assess.   5. The patient's functional capacity was above average for age and gender.   6. Adequate level of stress achieved.   7. No clinical, echocardiographic or electrocardiographic evidence for ischemia at a maximal workload.  Patient Performance: The patient exercised to stage III on a Shaheen protocol for 7 minutes and 14 seconds, achieving 10.40 METS. The peak heart rate achieved was 141 bpm, which was 90 % of  the age predicted target heart rate of 156 bpm. The resting blood pressure was 156/60 mmHg with a heart rate of 53 bpm. The standing blood pressure was 138/60 mmHg with a heart rate of 54 bpm. The patient's functional capacity was above average. The patient developed no symptoms during the stress exam. The blood pressure response was normal.     Cardiac MRI 3/2025:  LEFT VENTRICLE:  1. Mildly dilated LV (EDVi 180 ml/m2) with normal systolic function  (LVEF 60%).  2. No regional wall motion abnormalities.  3. Asymmetric basal septal hypertrophy to 1.3 cm at its maximal  thickness and normal LV indexed mass (LVMi 62 g/m2). Equivocal  chordal SANTA. Mild LVOT flow acceleration, correlate with TTE.  4. Normal native T2 values (<55ms).  5. Normal ECV 24%.  6. No evidence of LV thrombus.  7. Following administration of gadolinium, in the late phase, there  is no significant myocardial enhancement.      Current Outpatient Medications   Medication Instructions    amLODIPine (NORVASC) 2.5 mg, oral, Daily    apixaban (ELIQUIS) 5 mg, oral, 2 times daily    ascorbic acid (VITAMIN C) 500 mg, Daily    cholecalciferol (Vitamin D-3) 5,000 Units tablet Vitamin D3 125 MCG (5000 UT) Oral Tablet   Refills: 0        Tl CANDELARIO, Michael FUNK   Start : 31-Aug-2020   Active    cyanocobalamin (Vitamin B-12) 500 mcg tablet Take by mouth.    cyanocobalamin-cobamamide 5,000-100 mcg lozenge Daily RT    escitalopram (Lexapro) 10 mg tablet 0.5tab once daily    losartan (COZAAR) 100 mg, oral, Daily    nystatin-triamcinolone (Mycolog II) ointment Topical, 2 times daily     Impressions and Plan:    Pt is a 65 year old man with HTN and  murmur found on echocardiogram to have moderate AI in 2022, though difficult to  severity. Cardiac MRI only showed mild AI, so likely somewhere between mild to moderate AI, but LV cavity remained normal in size. Echo in October 2024  showed similar AI that is difficult to grade, but LV cavity size now mildly  vinod Also previously had  atypical CP( now resolved) , had no inducible ischemia by cardiac stress MRI  in 2023.  Prior Cardiac monitor did show PAFlutter with burden approx 3.6%, but more recent monitor without any atrial fibrillation .  He is to remain anticoagulated with eliquis 5 mg bid. BP optimized and  home assessment for AGUSTINA was negative , but sleep medicine feels less sensitive, so his dentist made an oral appliance which he is now using.  At this point he is essentially asymptomatic with Afib. For now no indication for ablation at this low AFib burden ( more recent monitor with no AFIb) . Echo in October  showed a more prominent proximal septal bulge with SANTA of the MV though no significant LVOT gradient was measures, however there was color acceleration of flow in LVOT ( unclear if obtained maximal gradient). Proximal septum measured 1.6 cm on prior MRI in 2023.  At that time his BP was adequatley controlled, but was on hydrochlorothiazide which was discontinued to avoid provoking issues with LVOT obstruction.   Given his color acceleration in LVOT and proximal bulge and LVH by voltage on EKG with occasional SOB ( though not exertional)  stress echo with LVOT gradients at rest and on exertion was performed which did not show any resting obstruction though did show a gradient of 64mmHg post stress. ( Pt was off the diuretics prior to stress echo). Cardiac MRI showed asymmetric septal hypertrophy ( septum 1.3 cm) with preserved LV function. Only equivocal chordal SANTA  with mild acceleration of flow in LVOT . There was no scar. There was now moderately severe AI with a trileaflet AV. The LV cavity was mildly dilated.   Plan  1. Moderate to severe AI by recent  MRI-   LV cavity now mildly dilated and pt remains asymptomatic. To follow clinically and with repeat echo vs MRI.   2. atypical chest pain- No ischemia by prior cardiac stress MRI. No further CP at this point  3. Palpitations - Prior  2 week  monitor showed PAF- anticoagulated with eliquis 5 mg bid and follow clinically. Repeat 2 week monitor in September 2023 without any afib. No further palpitations  4. HTN- BP optimized, Continue losartan dose to 100 mg daily and  amlodipine 2.5 mg daily. Follow BP and HR at home and log for review.   5. snoring and insomnia- working with dentist for splint to treat AGUSTINA. Less snoring though still poor sleep  6. Continue current level of exercise.   7. Asymmetric septal hypertrophy with proximal septal bulge. There was non-obstructive SANTA on resting echo in November with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

## 2025-05-01 NOTE — PATIENT INSTRUCTIONS
1. Hypertension-BP well controlled at home. To continue  losartan 100 mg daily and HCTZ 25 mg daily   2. Aortic insufficiency ( leaking aortic valve) - moderate by echo previously, moderate to severe  b most recent MRI with mildly dilated LV. Will review MRI with Dr Figueredo asymptomatic at this point.   3. Palpitations- pt found to have PAF with [id number] score of 1. Chronic anticoagulation- continue eliquis 5 mg bid. Most recent 2 week monitor( 10/2023)  without any AFIb, and no palpitations. To continue current regimen for now. If further palpitations can consider ablation in the future.   4. Pt with snoring and insomnia with AFIb-  was to have in lab sleep study scheduled  to assess for possible AGUSTINA ( in home study was negative though less sensitive. Follow up with sleep medicine.  Untreated sleep apnea may also make it difficult to control your blood pressure. Recently had oral appliance made and is using that.  5. LDL well controlled on plant based diet. Triglycerides  elevated- will recheck fasting lipid panel in 4 months after working on diet.   6. Proximal septal bulge on echo and MRI ( slightly different measurements and stress echo with an LVOT gradient. Will discuss with HCM board re possible diagnosis.Will set up virtual visit following meeting to review.  7. Continue current level of exercise.   8. Prediabetes- mildly elevated fasting glucose will check HgA1C.    Return to clinic in 4 months

## 2025-05-02 ENCOUNTER — MULTIDISCIPLINARY MEETING (OUTPATIENT)
Dept: CARDIOLOGY | Facility: HOSPITAL | Age: 65
End: 2025-05-02
Payer: COMMERCIAL

## 2025-05-02 NOTE — PROGRESS NOTES
Hypertrophic Cardiomyopathy Clinical Board Meeting  Date of [email or web address]    Participants: Ondina So, Nidia Colvin, Kyler Ashraf, Thomas Lopez, Luis Antonio Hickman, BINA Sepulveda, Brittny Willett, Janay Diehl, Neri Moody, Armida Ying. Jose Lagunasoah, Sylvia Pierson, Sandy Pratt, Genie Daniel.     Patient: Prasad Oneal,  1960, MRN [id number]    Primary clinician: Armida Ying    Date of referral: 2025    Heart failure type: HFpEF (LVEF >/= 50%)    Left ventricular ejection fraction (%): 65    Clinical summary: 65 y.o. male vegetarian who has been under cardiology care since  for new murmur, AI, LVH by EKG, HTN & PAF on DOAC. At that time, he complained of palpitations about 2 x per week . These had a sudden onset without precipitant and may last up to 30 seconds. + chest tightness with palpitations but no dizziness presyncope or syncope with palpitations and no SOB. These palpitations were described as fast but regular. These usually occurred at rest ( non-exertional). He typically exercises 3 x per week doing yoga. Has no CP or SOB with exertion but notices SOB when anxious or nervous. Denies PND orthopnea or LE edema.    EKG (2025): Sinus bradycardia. voltage criteria for left ventricular hypertrophy    CT of chest 2022: no coronary artery calcium identified. normal thoracic aortic size mild emphysematous changes bilaterally and some pulmonary nodules seen. ( multiple 3-6 mm)  Echo (2022): 1. Left ventricular systolic function is normal with a 60-65% estimated ejection fraction. 2. There is mildly reduced right ventricular systolic function. 3. RVSP within normal limits. 4. Trileaflet AV with moderate ( 2+ vs 2-3+) AI which is somewhat posteriorly directed. Exact mechanism of AI is unclear from this exam. Consider cardiac MRI vs SHI to reassess anatomy of AV and degree of AI if clinically indicated. 5. Moderate aortic valve regurgitation. 6. No prior echocardiogram  available for comparison.    Echo (9-): 1. Left ventricular systolic function is normal with a 55-60% estimated ejection fraction.2. There is low normal right ventricular systolic function.3. AV is poorly seen and the degree of AI is very difficult to quantify, though it looks significant. In addition, there appears to be SANTA of the MV leaflets with color acceleration within the LVOT though no significant LVOT gradient was measured.4. Note the patient is in sinus bradycardia at 50 bpm. (earlier in day was in rapid atrial fibrillation.5. Compared with the prior exam from 11/9/2022 the images today were technically more difficult and the AV was not as well seen. The degree of AI may have increased though the LV cavity appears to still be normal in size. Consider alternate imaging modality (SHI vs MRI) to further assess the degree of AI. Also given that the patient has exertional sx with SANTA at rest, unclear if a stress echo to assess LVOT gradients at rest and post stress would be helpful to determine cause of PEREZ.    Stress Echo (11-) 1. The resting ejection fraction was estimated at 60 to 65% with a peak exercise ejection fraction estimated at 70 to 75%. 2. Normal global left ventricular systolic function. 3. At rest there is mild AV leaflet thickening with mild to moderate AI. There is also SANTA of the subvalvular apparatus but no true leaflet SANTA. Non-obstructive with no significant LVOT gradient at rest or with valsalva. 4. After exercise the SANTA increased and no significant change in the mild MR post stress. Note that the LVOT gradient appears to have increased to 64mmHg, though many of the Doppler envelopes were contaminated with MR signal so difficult to assess. 5. The patient's functional capacity was above average for age and gender. 6. Adequate level of stress achieved. 7. No clinical, echocardiographic or electrocardiographic evidence for ischemia at a maximal workload.Patient Performance: The  patient exercised to stage III on a Shaheen protocol for 7 minutes and 14 seconds, achieving 10.40 METS. The peak heart rate achieved was 141 bpm, which was 90 % of the age predicted target heart rate of 156 bpm. The resting blood pressure was 156/60 mmHg with a heart rate of 53 bpm. The standing blood pressure was 138/60 mmHg with a heart rate of 54 bpm. The patient's functional capacity was above average. The patient developed no symptoms during the stress exam. The blood pressure response was normal.    Cardiac stress MRI (Jan 2023): LVEF 53%, no scar or ischemia mild sigmoid hypertrophy mild LAE Mild AI (regurgitant fraction 17%    Cardiac MRI (3-): 1. Mildly dilated LV (EDVi 180 ml/m2) with normal systolic function  (LVEF 60%). 2. No regional wall motion abnormalities.3. Asymmetric basal septal hypertrophy to 1.3 cm at its maximal thickness and normal LV indexed mass (LVMi 62 g/m2). Equivocal chordal SANTA. Mild LVOT flow acceleration, correlate with TTE. 4. Normal native T2 values (<55ms).5. Normal ECV 24%. 6. No evidence of LV thrombus. 7. Following administration of gadolinium, in the late phase, there is no significant myocardial enhancement.    Holter (12/2022): PVCs, 0.04% PSVCs, atrial flutter burden 3.57% longest 3 hours, fastest 116 bpm.    Holter (10/2023): SR ave HR 68. 5 beat run of VT (138bpm)    CV Genetics: not ordered    BNP (4-): 90    Meds: amlodipine, apixaban, losartan escitalopram, Vit D, Vit B, Vit C      Board recommendations: Case presented. Imaging reviewed. Board felt that flow acceleration in LVOT most likely related to a high degree of AI as opposed to HCM.

## 2025-05-05 DIAGNOSIS — Z79.01 CHRONIC ANTICOAGULATION: ICD-10-CM

## 2025-05-05 RX ORDER — APIXABAN 5 MG/1
5 TABLET, FILM COATED ORAL 2 TIMES DAILY
Qty: 180 TABLET | Refills: 3 | Status: SHIPPED | OUTPATIENT
Start: 2025-05-05

## 2025-05-08 ENCOUNTER — TELEMEDICINE (OUTPATIENT)
Dept: CARDIOLOGY | Facility: HOSPITAL | Age: 65
End: 2025-05-08
Payer: COMMERCIAL

## 2025-05-08 DIAGNOSIS — I51.7 LEFT VENTRICULAR HYPERTROPHY: ICD-10-CM

## 2025-05-08 DIAGNOSIS — I35.1 NONRHEUMATIC AORTIC VALVE INSUFFICIENCY: ICD-10-CM

## 2025-05-08 DIAGNOSIS — I10 PRIMARY HYPERTENSION: Primary | ICD-10-CM

## 2025-05-08 DIAGNOSIS — Z79.01 CHRONIC ANTICOAGULATION: ICD-10-CM

## 2025-05-08 DIAGNOSIS — I48.0 PAF (PAROXYSMAL ATRIAL FIBRILLATION) (MULTI): ICD-10-CM

## 2025-05-08 PROCEDURE — G2211 COMPLEX E/M VISIT ADD ON: HCPCS | Performed by: INTERNAL MEDICINE

## 2025-05-08 PROCEDURE — 99213 OFFICE O/P EST LOW 20 MIN: CPT | Performed by: INTERNAL MEDICINE

## 2025-05-08 NOTE — PROGRESS NOTES
Primary Care Physician: Michael Boothe MD  Virtual or Telephone Consent    An interactive audio and video telecommunication system which permits real time communications between the patient (at the originating site) and provider (at the distant site) was utilized to provide this telehealth service.   Verbal consent was requested and obtained from Prasad Oneal on this date, 05/08/25 for a telehealth visit and the patient's location was confirmed at the time of the visit.     Date of Visit: 05/08/2025  2:00 PM EDT  Location of visit: [facility]   Type of Visit: Established Patient     HPI / Summary:   Prasad Oneal is a very pleasant 65 y.o. male  with HTN and LVH by EKG with AI and with PAF now chronically anticoagulated who returns virtually to go over discussion from the hypertrophic cardiomyopathy board to review his case and imaging results.     CAD risk factors: + HTN, no DM,m no FHx CAD, never smoked, no dyslipidemia ( vegetarian)     [facility] assessment:  CT of chest 11/29/2022: no coronary artery calcium identified. normal thoracic aortic size mild emphysematous changes bilaterally and some pulmonary nodules seen. ( multiple 3-6 mm)   Echo for new murmur Nov 9 2022: normal LV size and systolic function LVEF 60-65% indeterminant diastology, mild LAE, mobile atrial septum, AV trileaflet with moderate AI ( 2+ or possibly 2-3+) difficult to grade , mild MR and nonobstructive SANTA and mildly reduced RV systolic function.    2 week monitor 12/2022: PVCs, 0.04% PSVCs, atrial flutter burden 3.57% longest 3 hours, fastest 116 bpm.  Cardiac stress MRI- Jan 5 2023: LVEF 53%, no scar or ischemia mild sigmoid hypertrophy mild LAE Mild AI ( regurgitant fraction 17% )  2 week monitor: 9/21/2023-10/507412: one episode of nonsustained  bpm < 1% Ves and < 1% SVEs 0% Afib.      Pt was started on eliquis 5 mg bid for [id number] score of 1. He has had no bleeding issues on the eliquis, Patient notes having no  palpitations  and denies any presyncope or syncope and denies PEREZ or CP on exertion. PT recently saw sleep medicine for ? Of AGUSTINA given daytime sleepiness, snoring and gasping etc. Home study did not really show significant AGUSTINA, though thought to be false negative and considered  repeating an in lab sleep study, however his dentist is making him a splint to treat anyway.      More recent imaging to follow up AI:  Echocardiogram 10/2024: septal bulge up to 1.5 cm thick by echo ( notably only 1.3 cm by MRI) with moderate AI , mildly dilated LV with preserved LV systolic function. Nonobstructive SANTA at rest. Vaslalva not done. There was color acceleration within the LVOT, unclear if peak gradients could have been missed. Moderate AI ( though again difficult to grade)  Stress echo 11/13/2024: LVEF 60-65% increased to 70-75%. There was an increase in SANTA with stress though no obvious increase in MR. The LVOT gradient increased to 64mmHg . Fx capacity was above average for age and gender.  Cardiac MRI 3/2025: mildly dilated LV with LVEF 60%. Asymmetric septal hypertrophy with septum measuring 1.3 cm. Equivocal chordal SANTA and mild LVOT acceleration. No scar no thrombus and moderate to severe AI.       ROS: Full 10 pt review of symptoms of negative unless discussed above.     Problems:   Problem List[1]  Medical History:   Medical History[2]  Surgical Hx:   Surgical History[3]   Social Hx:   Tobacco Use: Low Risk  (5/1/2025)    Patient History     Smoking Tobacco Use: Never     Smokeless Tobacco Use: Never     Passive Exposure: Not on file     Alcohol Use: Not on file     Family Hx:   Family History[4]   Exam:   Vitals: There were no vitals filed for this visit.  Wt Readings from Last 5 Encounters:   05/01/25 71.7 kg (158 lb)   03/17/25 71.5 kg (157 lb 10.1 oz)   03/17/25 71.5 kg (157 lb 10.1 oz)   10/24/24 67.6 kg (149 lb)   10/24/24 71.5 kg (157 lb 9.6 oz)      Constitutional:       Appearance: Healthy appearance. Not in  distress.   Pulmonary:      Effort: Pulmonary effort is normal.   Neurological:      Mental Status: Alert and oriented to person, place, and time.       Labs:   Recent Labs     10/28/22  0852 11/23/21  1051 11/05/20  1018 08/31/20  0934   WBC 6.6 7.8 6.5 7.9   HGB 13.8 14.4 14.0 14.2   HCT 41.8 43.3 39.9* 43.2    213 169 190   MCV 96 93 89 96     Recent Labs     04/28/25  1042 01/12/24  1007 06/26/23  0901 02/15/23  1034 10/28/22  0852 11/23/21  1051 11/05/20  1018 08/31/20  0934    142 140 144 142 141 139 144   K 4.3 4.0 3.9 4.3 3.8 4.0 4.2 4.6    104 104 105 106 104 103 107   BUN 11 12 9 9 9 11 10 10   CREATININE 0.98 0.79 0.91 0.86 0.85 0.78 0.94 0.82      Recent Labs     04/28/25  1042 08/06/22  0824   HGBA1C  --  4.9   BNP 90  --      Lab Results   Component Value Date    CHOL 130 04/28/2025    HDL 30 (L) 04/28/2025    LDLF 45 10/28/2022    TRIG 185 (H) 04/28/2025     Lab Results   Component Value Date    LDLCALC 72 04/28/2025      Notable Studies: imaging personally reviewed and summarized by me below  EKG:  Encounter Date: 05/01/25   ECG 12 lead (Clinic Performed)   Result Value    Ventricular Rate 57    Atrial Rate 57    CO Interval 200    QRS Duration 108    QT Interval 454    QTC Calculation(Bazett) 441    P Axis 58    R Axis -24    T Axis 45    QRS Count 9    Q Onset 211    P Onset 111    P Offset 169    T Offset 438    QTC Fredericia 446    Narrative    Sinus bradycardia  Voltage criteria for left ventricular hypertrophy  Abnormal ECG  When compared with ECG of 24-OCT-2024 09:47,  No significant change was found  Confirmed by Stu Tomlinson ([id number]) on 5/1/2025 9:37:25 PM       Echo:  - Echo (9/21/2023)  PHYSICIAN INTERPRETATION:  Left Ventricle: The left ventricular systolic function is normal, with an estimated ejection fraction of 55-60%. There are no regional wall motion abnormalities. The left ventricular cavity size is normal. Left ventricular diastolic filling was  indeterminate.  Left Atrium: The left atrium is mildly dilated.  Right Ventricle: The right ventricle is normal in size. There is low normal right ventricular systolic function.  Right Atrium: The right atrium is normal in size.  Aortic Valve: The aortic valve was not well visualized. There is indeterminate aortic valve regurgitation. The peak instantaneous gradient of the aortic valve is 12.8 mmHg. The mean gradient of the aortic valve is 7.4 mmHg. AV is porly seen and the degree of AI is vry difficult to quantify, though looks singificant. In addition there appears to be SANTA of the MV leaflets with color acceleration within the LVOT though no significant LVOT gradient was measured.  Mitral Valve: The mitral valve is normal in structure. There is trace to mild mitral valve regurgitation. There is chordal and possibly leaflet tip SANTA. There is color acceleration within tht eLVOT.  Tricuspid Valve: The tricuspid valve is structurally normal. There is trace tricuspid regurgitation. The right ventricular systolic pressure is unable to be estimated.  Pulmonic Valve: The pulmonic valve is not well visualized. There is physiologic pulmonic valve regurgitation.  Pericardium: There is no pericardial effusion noted.  Aorta: The aortic root is abnormal. There is mild dilatation of the ascending aorta. There is mild dilatation of the aortic root.  In comparison to the previous echocardiogram(s): Compared with study from 11/9/2022,. Compared with the prior exam from 11/9/2022 the images today were technically more difficult and the AV was not as well seen. The degreee of AI may have increased though the LV cavity appears to still be normal in size. Consider alternate imaging modailty( SHI vs MRI) to further assess the deree of AI. Also given that the patinet has exertional sx with SANTA at rest, unclear if a stress echo to assess LVOT gradients at rest and post stress would be helpful to deterine cause of PEREZ.         CONCLUSIONS:  1. Left ventricular systolic function is normal with a 55-60% estimated ejection fraction.  2. There is low normal right ventricular systolic function.  3. AV is porly seen and the degree of AI is vry difficult to quantify, though looks singificant. In addition there appears to be SANTA of the MV leaflets with color acceleration within the LVOT though no significant LVOT gradient was measured.  4. Note the patient is in sinus bradycadia at 50 bpm. ( earlier in day was in rapid atrial fibrillation.  5. Compared with the prior exam from 11/9/2022 the images today were technically more difficult and the AV was not as well seen. The degreee of AI may have increased though the LV cavity appears to still be normal in size. Consider alternate imaging modailty( SHI vs MRI) to further assess the deree of AI. Also given that the patinet has exertional sx with SANTA at rest, unclear if a stress echo to assess LVOT gradients at rest and post stress would be helpful to deterine cause of PEREZ.     Cardiac stress MRI 1/5/2023:  No evidence of ischemia on Regadenoson induced stress.  No evidence of scar/fibrosis.      LEFT VENTRICLE: Quantitative LVEF 53 %.     VIABILITY: Hyperenhancement is normal.     STRESS: Stress perfusion is normal.     RIGHT VENTRICLE: There is mild RVH. RV cavity size is normal. RV   systolic function is normal.     LV/RV SEPTUM: Mild sigmoidal hypertrophy with maximal thickness of   1.6 cm.     LA/RA SEPTUM: The atrial septum is intact.     LEFT ATRIUM: LA is mildly enlarged.     RIGHT ATRIUM: There is no RA mass/thrombus. RA cavity size is normal.     PERICARDIUM: Pericardium is normal. There is no pericardial effusion.     AORTIC VALVE: Peak aortic valve velocity 200 cm/sec. Aortic   regurgitant volume 21 ml. Aortic regurgitant fraction 17 %.  Peak aortic valve gradient 16 mmHg.     MITRAL VALVE: Mitral valve leaflets are normal. The mitral valve   annulus is normal in size. There is mild  mitral  regurgitation. Mitral valve is mildly thickened.     TRICUSPID VALVE: Tricuspid valve leaflets are normal. The tricuspid   valve annulus is normal in size.     AORTIC ROOT: The aortic root is mildly dilated.        Echo 10/24/2024:  PHYSICIAN INTERPRETATION:  Left Ventricle: Left ventricular ejection fraction is normal, calculated by Ren's biplane at 58%. There are no regional left ventricular wall motion abnormalities. The left ventricular cavity size is normal. Spectral Doppler shows an impaired relaxation pattern of left ventricular diastolic filling. Proximal septal bulge up to 1.5 cm thick, with nonobstructive SANTA at rest. Valsalva was not performed.  Left Atrium: The left atrium is mildly dilated.  Right Ventricle: The right ventricle is normal in size. There is normal right ventricular global systolic function.  Right Atrium: The right atrium is normal in size.  Aortic Valve: The aortic valve is trileaflet. The aortic valve dimensionless index is 0.86. There is moderate aortic valve regurgitation. The peak instantaneous gradient of the aortic valve is 16.3 mmHg. The mean gradient of the aortic valve is 9.2 mmHg. Trileaflet AV with AI , possibly moderate though difficult to assess which originates centrally and from the commissure between the RCC and LCC, with no AS.  Mitral Valve: The mitral valve is normal in structure. There is mild mitral valve regurgitation which is centrally directed. Structurally normal leaflets with chordal and leaflet tip SANTA and only mild centrally directed MR. There is color acceleration within the LVOT around the basal septum though no high gradients were measured.  Tricuspid Valve: The tricuspid valve is structurally normal. There is trace tricuspid regurgitation. The Doppler estimated RVSP is within normal limits at 24.0 mmHg.  Pulmonic Valve: The pulmonic valve is structurally normal. There is physiologic pulmonic valve regurgitation.  Pericardium: There is no  pericardial effusion noted.  Aorta: The aortic root is normal. The aortic root is at the upper limits of normal size.  Systemic Veins: The inferior vena cava appears normal in size, with IVC inspiratory collapse greater than 50%.  In comparison to the previous echocardiogram(s): Compared with study dated 9/21/2023,. Compared withthe prior exam frmo 9/21/2023, the degree of AI appears similar though still difficult to grade severity , probably moderate. The LV cavioty appear similar in size. There is still a proximal sweptal bulge which appears a little more prominent vs just views obtained today. There was already SANTA with color acceleration within the LVOT but no significant LVOT gradients were measured in either study, unclear if could hae been missed.        CONCLUSIONS:   1. Left ventricular ejection fraction is normal, calculated by Ren's biplane at 58%.   2. Spectral Doppler shows an impaired relaxation pattern of left ventricular diastolic filling.   3. Proximal septal bulge up to 1.5 cm thick, with nonobstructive SANTA at rest. Valsalva was not performed.   4. There is normal right ventricular global systolic function.   5. The left atrium is mildly dilated.   6. Right ventricular systolic pressure is within normal limits.   7. Trileaflet AV with AI , possibly moderate though difficult to assess which originates centrally and from the commissure between the RCC and LCC, with no AS.   8. Moderate aortic valve regurgitation.   9. Compared withthe prior exam frmo 9/21/2023, the degree of AI appears similar though still difficult to grade severity , probably moderate. The LV cavioty appear similar in size. There is still a proximal sweptal bulge which appears a little more prominent vs just views obtained today. There was already SANTA with color acceleration within the LVOT but no significant LVOT gradients were measured in either study, unclear if could hae been missed.     3/28/2025- cardiac  MRI  FINDINGS:  CARDIAC CHAMBERS:  Normal atrioventricular and ventriculoarterial concordance      LEFT ATRIUM:  Normal size (DANNY - 32.54 ml/m2).      RIGHT ATRIUM:  Normal (RA area max 4ch - 20.20 cm2)      INTERATRIAL SEPTUM:  Intact.      LEFT VENTRICLE:  1. Mildly dilated LV (EDVi 180 ml/m2) with normal systolic function  (LVEF 60%).  2. No regional wall motion abnormalities.  3. Asymmetric basal septal hypertrophy to 1.3 cm at its maximal  thickness and normal LV indexed mass (LVMi 62 g/m2). Equivocal  chordal SANTA. Mild LVOT flow acceleration, correlate with TTE.  4. Normal native T2 values (<55ms).  5. Normal ECV 24%.  6. No evidence of LV thrombus.  7. Following administration of gadolinium, in the late phase, there  is no significant myocardial enhancement.      Quantitative left ventricular functional values are as follows:  EDV = 221 cc; EDVi = 118 cc/m2  ESV = 86 cc; ESVi = 46 cc/m2  LV mass = 123 gm; LVMi = 66 gm/m2  Stroke volume = 134 cc; SVi = 72 cc/m2  LVEF = 61 %      Myocardial T2 mapping-48 ms.      LV septal wall thickness (anterobasal): 1.1 cm  LV postero-inferior wall thickness: 0.7 cm  LVEDD: 5.8 cm  LVESD: 3.4 cm      RIGHT VENTRICLE:  1. Normal RV size (EDVi 92 ml/m2) and systolic function (RVEF 62%).  2. No regional wall motion abnormalities.  3. No abnormal delayed enhancement in the myocardium.      Quantitative right ventricular functional values are as follows:  RVEDV = 113.66 ml; RVEDVi = 60.83 ml/m2  RVESV = 60.97 ml; RVESVi = 32.64 ml/m2  RVSV = 52.68 ml; RVSVi = 28.20 ml/m2  RVEF = 46.00 %  RVCO = 2.70 l/min; RVCI = 1.45 l/min/m2      INTERVENTRICULAR SEPTUM:  Intact.      AORTIC VALVE:  The aortic valve is trileaflet.  There is quantitatively moderate-severe aortic regurgitation. The AI  jet is central in nature and appears to stem from malcoaptation of  the right and left coronary cusps. IVAN 4.4 cm2. Flow quantification  through the ascending aorta: Forward volume = 118.48  cc/beat  Reverse volume = -44.73 cc/beat  Net forward volume = 73.75 cc/beat  Aortic regurgitant fraction = 38 %      MITRAL VALVE:  The mitral valve leaflets appear normal.  There is mild mitral regurgitation by qualitative assessment.      TRICUSPID VALVE:  There is qualitatively trivial tricuspid regurgitation.      PULMONARY VALVE:  Not assessed.      PERICARDIUM:  The pericardium is normal.  There is no pericardial effusion.      THORACIC AORTA:  The thoracic aorta appears normal in course and contour. The aortic  root is normal in size. There is no evidence for acute aortic  pathology. The ascending thoracic aorta is 3.6 cm in diameter. The  arch vessel branching pattern is  normal.   All the arch branch  vessels appear widely patent in their proximal portions.      AORTIC ROOT DIMENSIONS:  Annulus: 2.4 cm  Aortic root(sinus of valsalva): 3.2 cm  Sinotubular junction: 2.5 cm      PULMONARY ARTERIES:  The central pulmonary arteries appear normal (MPA-2.5 cm, RPA-2.3 cm,  LPA-1.8 cm).      SYSTEMIC AND PULMONARY VEINS:  Normal systemic venous and pulmonary venous return.  The SVC is of normal caliber. IVC appears normal  Normal pulmonary venous anatomy.      CHEST:  The chest wall is normal.  Limited imaging through the lungs reveals no gross abnormalities. No  pleural effusion.      UPPER ABDOMEN:  Limited imaging through the upper abdomen reveals no abnormalities of  the visualized organs.      IMPRESSION:  1. Mildly dilated LV (EDVi 180 ml/m2) with normal systolic function  (LVEF 60%).  2. Mild basal septal hypertrophy (1.3 cm) with LVOT flow  acceleration. Equivocal chordal SANTA.  3. Tricuspid aortic valve with moderate-severe central aortic  regurgitation (RF 38%). Consider further assessment with SHI.  4. No evidence of myocardial fibrosis, infiltration or infarction  based on LGE imaging.  5. Normal RV size (EDVi 61 ml/m2) with preserved systolic function  (RVEF 46%).      Stress echo  11/13/2024:  Summary:   1. The resting ejection fraction was estimated at 60 to 65% with a peak exercise ejection fraction estimated at 70 to 75%.   2. Normal global left ventricular systolic function.   3. At rest there is mild AV leaflet thickening with mild to moderate AI. There is also SANTA of the subvalvular apparatus but no true leaflet SANTA . Non-obstructive with no significant LVOT gradient at rest or with valsalva.   4. After exercise the SANTA increased and no significant change in the mild MR post stress. Note that the LVOT gradient appears to have increased to 64mmHg, though many of the Doppler envelopes were contaminated with MR signal so difficult to assess.   5. The patient's functional capacity was above average for age and gender.   6. Adequate level of stress achieved.   7. No clinical, echocardiographic or electrocardiographic evidence for ischemia at a maximal workload.  Patient Performance: The patient exercised to stage III on a Shaheen protocol for 7 minutes and 14 seconds, achieving 10.40 METS. The peak heart rate achieved was 141 bpm, which was 90 % of the age predicted target heart rate of 156 bpm. The resting blood pressure was 156/60 mmHg with a heart rate of 53 bpm. The standing blood pressure was 138/60 mmHg with a heart rate of 54 bpm. The patient's functional capacity was above average. The patient developed no symptoms during the stress exam. The blood pressure response was normal.          Current Outpatient Medications   Medication Instructions    amLODIPine (NORVASC) 2.5 mg, oral, Daily    ascorbic acid (VITAMIN C) 500 mg, Daily    cholecalciferol (Vitamin D-3) 5,000 Units tablet Vitamin D3 125 MCG (5000 UT) Oral Tablet   Refills: 0        Tl CANDELARIO, Michael FUNK   Start : 31-Aug-2020   Active    cyanocobalamin (Vitamin B-12) 500 mcg tablet Take by mouth.    cyanocobalamin-cobamamide 5,000-100 mcg lozenge Daily RT    Eliquis 5 mg, oral, 2 times daily    escitalopram (Lexapro) 10 mg  tablet 0.5tab once daily    losartan (COZAAR) 100 mg, oral, Daily    nystatin-triamcinolone (Mycolog II) ointment Topical, 2 times daily     Impressions and Plan:    Pt is a 65 year old man with HTN and  murmur found on echocardiogram to have moderate AI in 2022, though difficult to  severity.  Prior Cardiac monitor did show PAFlutter with burden approx 3.6%, but more recent monitor without any atrial fibrillation .  He is to remain anticoagulated with eliquis 5 mg bid. BP optimized and  home assessment for AGUSTINA was negative , but sleep medicine feels less sensitive, so his dentist made an oral appliance which he is now using.  At this point he is essentially asymptomatic with Afib. For now no indication for ablation at this low AFib burden ( more recent monitor with no AFIb) . Echo in October  showed a more prominent proximal septal bulge with SANTA of the MV though no significant LVOT gradient was measures, however there was color acceleration of flow in LVOT ( unclear if obtained maximal gradient). Proximal septum measured 1.6 cm on prior MRI in 2023.  At that time his BP was adequatley controlled, but was on hydrochlorothiazide which was discontinued to avoid provoking issues with LVOT obstruction.   Given his color acceleration in LVOT and proximal bulge and LVH by voltage on EKG with occasional SOB ( though not exertional)  stress echo with LVOT gradients at rest and on exertion was performed which did not show any resting obstruction though did show a gradient of 64mmHg post stress. ( Pt was off the diuretics prior to stress echo). Recent Cardiac MRI (3/2025) showed asymmetric septal hypertrophy ( septum 1.3 cm) with preserved LV function. Only equivocal chordal SANTA  with mild acceleration of flow in LVOT . There was no scar. There was now moderately severe AI with a trileaflet AV. The LV cavity was mildly dilated. Reviewed case and imaging with the HCM board that felt the patinet did not have HCM ( septum  not thick enough and no scarring and only equivocal SANTA on MRI) and that the LVOT gradients with exercise were elevated due to increased flow from the moderate to severe AI. The LV cavity is mildly dilated now from the AI. Will follow with  repeat echocardiogram.Pt also knows to look for sx.   Plan  1. Moderate to severe AI by recent  MRI-   LV cavity now mildly dilated and pt remains asymptomatic. To follow clinically and with repeat echo vs MRI.   2. atypical chest pain- No ischemia by prior cardiac stress MRI. No further CP at this point  3. Palpitations - Prior  2 week monitor showed PAF- anticoagulated with eliquis 5 mg bid and follow clinically. Repeat 2 week monitor in September 2023 without any afib. No further palpitations.   4. HTN- BP optimized, Continue losartan dose to 100 mg daily and  amlodipine 2.5 mg daily. Follow BP and HR at home and log for review.   5. snoring and insomnia- working with dentist for splint to treat AGUSTINA. Less snoring though still poor sleep  6. Continue current level of exercise.   7. Asymmetric septal hypertrophy with proximal septal bulge. There was non-obstructive SANTA on resting echo in November with increase in SANTA post stress with LVOT gradient increasing to 64mmHg. MRI without scarring and no SANTA of MV on MRI. HCM board did not feel this was HCM but rather proximal septal hypertrophy ( mild) with increased LVOT gradients from increased volume load from presence of moderate to severe AI.   8. Elevated triglycerides- LDL well controlled on plant based diet. Discussed possible dietary changes to reduce triglycerides. Will recheck lipids in 4 months.  9. Mildly elevated fasting glucose- will check HgA1C . Prediabetes.  Return to clinic in 4 months     Patient Instructions:  If you have any questions or need cardiac medication refills, please call my office at [phone],      To reach my office please call [phone]  To schedule an appointment call [phone].           ____________________________________________________________  Armida Ying MD  Division of Cardiovascular Medicine  [facility]        [1]   Patient Active Problem List  Diagnosis    Arthritis of right acromioclavicular joint    Balanitis    Basal cell carcinoma of skin of scalp and neck    Calculus of kidney    Nephrolithiasis, uric acid    Complex renal cyst    Depression    Dyspepsia    Erectile dysfunction    Esophageal abnormality    Heart murmur    Hiatal hernia    HTN (hypertension)    Inguinal hernia    Insomnia    Kidney mass    Left ventricular hypertrophy    Long QT interval    Hemangioma of skin and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

## 2025-06-05 DIAGNOSIS — I10 HYPERTENSION, UNSPECIFIED TYPE: ICD-10-CM

## 2025-06-05 DIAGNOSIS — I10 PRIMARY HYPERTENSION: Primary | ICD-10-CM

## 2025-06-05 RX ORDER — LOSARTAN POTASSIUM 100 MG/1
100 TABLET ORAL DAILY
Qty: 90 TABLET | Refills: 3 | Status: SHIPPED | OUTPATIENT
Start: 2025-06-05

## 2025-09-04 ENCOUNTER — APPOINTMENT (OUTPATIENT)
Dept: CARDIOLOGY | Facility: HOSPITAL | Age: 65
End: 2025-09-04
Payer: COMMERCIAL

## 2025-09-22 ENCOUNTER — APPOINTMENT (OUTPATIENT)
Dept: CARDIOLOGY | Facility: HOSPITAL | Age: 65
End: 2025-09-22
Payer: COMMERCIAL